# Patient Record
Sex: MALE | Race: WHITE | NOT HISPANIC OR LATINO | Employment: OTHER | URBAN - METROPOLITAN AREA
[De-identification: names, ages, dates, MRNs, and addresses within clinical notes are randomized per-mention and may not be internally consistent; named-entity substitution may affect disease eponyms.]

---

## 2019-05-23 ENCOUNTER — HOSPITAL ENCOUNTER (EMERGENCY)
Facility: HOSPITAL | Age: 69
Discharge: HOME | End: 2019-05-23
Attending: EMERGENCY MEDICINE | Admitting: EMERGENCY MEDICINE

## 2019-05-23 ENCOUNTER — APPOINTMENT (OUTPATIENT)
Dept: CT IMAGING | Facility: HOSPITAL | Age: 69
End: 2019-05-23

## 2019-05-23 VITALS
HEIGHT: 72 IN | HEART RATE: 68 BPM | SYSTOLIC BLOOD PRESSURE: 115 MMHG | RESPIRATION RATE: 16 BRPM | OXYGEN SATURATION: 98 % | DIASTOLIC BLOOD PRESSURE: 58 MMHG | TEMPERATURE: 98.1 F

## 2019-05-23 DIAGNOSIS — S80.812A ABRASION OF ANTERIOR LEFT LOWER LEG, INITIAL ENCOUNTER: ICD-10-CM

## 2019-05-23 DIAGNOSIS — S00.03XA CONTUSION OF SCALP, INITIAL ENCOUNTER: Primary | ICD-10-CM

## 2019-05-23 DIAGNOSIS — S50.312A ABRASION OF LEFT ELBOW, INITIAL ENCOUNTER: ICD-10-CM

## 2019-05-23 DIAGNOSIS — S40.011A CONTUSION OF RIGHT SHOULDER, INITIAL ENCOUNTER: ICD-10-CM

## 2019-05-23 PROCEDURE — 70450 CT HEAD/BRAIN W/O DYE: CPT

## 2019-05-23 PROCEDURE — 99283 EMERGENCY DEPT VISIT LOW MDM: CPT

## 2021-05-05 ENCOUNTER — TREATMENT (OUTPATIENT)
Dept: PHYSICAL THERAPY | Facility: CLINIC | Age: 71
End: 2021-05-05

## 2021-05-05 DIAGNOSIS — M25.562 ACUTE BILATERAL KNEE PAIN: Primary | ICD-10-CM

## 2021-05-05 DIAGNOSIS — R26.9 ABNORMAL GAIT: ICD-10-CM

## 2021-05-05 DIAGNOSIS — M25.561 ACUTE BILATERAL KNEE PAIN: Primary | ICD-10-CM

## 2021-05-05 PROCEDURE — 97110 THERAPEUTIC EXERCISES: CPT | Performed by: PHYSICAL THERAPIST

## 2021-05-05 PROCEDURE — 97162 PT EVAL MOD COMPLEX 30 MIN: CPT | Performed by: PHYSICAL THERAPIST

## 2021-05-05 NOTE — PROGRESS NOTES
Physical Therapy Initial Evaluation and Plan of Care      Patient: Onel Campbell   : 1950  Diagnosis/ICD-10 Code:  No primary diagnosis found.  Referring practitioner: Fredy Lomas MD  Date of Initial Visit: 2021  Today's Date: 2021  Patient seen for 1 sessions           Subjective Evaluation    History of Present Illness  Mechanism of injury: Pt notes difficulty with prolonged ambualtion 3-5 mins; pt had (R) foot sx resulting in 8-10 wks nwb.  Pt/ wife notes progressive immobilization has resulted in decreased tolerance with functional mobility.     nsaid contraindicated.    Subjective comment: (B) knee pain/  progressive OA knee Quality of life: good    Pain  Current pain ratin  At best pain rating: 3  At worst pain rating: 10  Quality: dull ache, tight, throbbing, sharp, discomfort and knife-like  Aggravating factors: ambulation, squatting, stairs, standing, lifting, movement, outstretched reach and repetitive movement    Social Support  Lives in: one-story house  Lives with: spouse    Patient Goals  Patient goals for therapy: increased strength, independence with ADLs/IADLs, increased motion, improved balance, decreased pain, decreased edema and return to sport/leisure activities             Objective          Observations   Left Knee   Positive for effusion.     Right Knee   Positive for effusion.     Additional Knee Observation Details  (+) antalgic gait with use of spc; limited stride length    Tenderness   Left Knee   Tenderness in the medial joint line.     Right Knee   Tenderness in the medial joint line.     Neurological Testing     Sensation     Knee   Left Knee   Intact: light touch    Right Knee   Intact: light touch     Active Range of Motion   Left Knee   Flexion: 110 degrees   Extensor la degrees     Right Knee   Flexion: 112 degrees   Extensor lag: 3 degrees     Strength/Myotome Testing     Left Knee   Flexion: 4-  Extension: 3+  Quadriceps contraction: poor    Right  Knee   Flexion: 4-  Extension: 3+  Quadriceps contraction: poor     General Comments     Knee Comments  Timed sit-stand x 5 test 27 s;  Poor tolerance with frontal / transverse plane mvmt. Poor tolerance with ascending stairs. High risk for fall. No recent hx of falls.       See Exercise, Manual, and Modality Logs for complete treatment.       Functional Outcome Score: 25% LE fx score        Assessment & Plan     Assessment  Impairments: abnormal gait, abnormal muscle firing, abnormal muscle tone, abnormal or restricted ROM, activity intolerance, impaired balance, impaired physical strength, lacks appropriate home exercise program, pain with function, safety issue and weight-bearing intolerance  Assessment details: Onel Campbell is a 71 y.o. year-old male referred to physical therapy for (B) knee pain / abnormal gait. He presents with a stable clinical presentation.  He has comorbidities of progressive OA (B) Knee and no personal factors  that may affect his progress in the plan of care.  Pt demonstrates decreased (B) knee AROM, severe ttp ant/ med joint line knee resulting in poor tolerance with sit-stand transfer; pain with prolonged sitting > 20 mins, limited standing-walking tolerance with use of spc 3-5 mins.  Pt is high risk for fall secondary to decreased LE MMT 3+/5 respectively.  malgorzata x 1 with frontal plane movement patterns. Signs and symptoms are consistent with physical therapy diagnosis of (B) knee pain/ abnormal gait.   Prognosis: good  Functional Limitations: walking, uncomfortable because of pain, standing and stooping  Goals  Plan Goals: stg 6 wks    Pt to be educated/ independent with initial HEP.    Increased (B) knee AAROM 0/0/ 115 degrees to allow for increased ease with dressing/ bathing activities.    Decreased ant/ med joint line pain from severe to minimal to allow for increased ease with sit-stand transfer.    ltg 12 wks    Improve timed sit-stand x 5 test from 27s to 22 s    Increased  (B) quad / glute medius MMT to 4/5 to allow for 15-20 min tolerance with prolonged standing/ambulation with use of spc.     Improve LE fx score from 25% to 50%    Plan  Therapy options: will be seen for skilled physical therapy services  Planned modality interventions: cryotherapy, electrical stimulation/Russian stimulation, TENS, ultrasound and thermotherapy (hydrocollator packs)  Planned therapy interventions: abdominal trunk stabilization, manual therapy, motor coordination training, neuromuscular re-education, balance/weight-bearing training, ADL retraining, flexibility, functional ROM exercises, gait training, home exercise program, joint mobilization, transfer training, therapeutic activities, strengthening, stretching and soft tissue mobilization  Other planned therapy interventions: aquatic / land based PT  Frequency: 2x week  Duration in weeks: 12  Treatment plan discussed with: patient        Timed:  Manual Therapy:         mins  50797;  Therapeutic Exercise:     15 mins  72929;     Neuromuscular Narendra:        mins  32864;    Therapeutic Activity:          mins  94118;     Gait Training:           mins  38130;     Ultrasound:          mins  46673;    Electrical Stimulation:         mins  03739 ( );  Iontophoresis         mins 10523  Dry Needling        mins      Untimed:  Electrical Stimulation:         mins  91000 ( );  Mechanical Traction:         mins  66181;     Timed Treatment:   15   mins   Total Treatment:    40    mins    PT SIGNATURE: Scot Barney, PT   DATE TREATMENT INITIATED: 5/5/2021    Initial Certification  Certification Period: 8/3/2021  I certify that the therapy services are furnished while this patient is under my care.  The services outlined above are required by this patient, and will be reviewed every 90 days.     PHYSICIAN: Fredy Lomas MD      DATE:     Please sign and return via fax to [phone] Thank you, [facility].

## 2021-05-06 ENCOUNTER — TREATMENT (OUTPATIENT)
Dept: PHYSICAL THERAPY | Facility: CLINIC | Age: 71
End: 2021-05-06

## 2021-05-06 DIAGNOSIS — R26.9 ABNORMAL GAIT: ICD-10-CM

## 2021-05-06 DIAGNOSIS — M25.561 ACUTE BILATERAL KNEE PAIN: Primary | ICD-10-CM

## 2021-05-06 DIAGNOSIS — M25.562 ACUTE BILATERAL KNEE PAIN: Primary | ICD-10-CM

## 2021-05-06 PROCEDURE — 97110 THERAPEUTIC EXERCISES: CPT | Performed by: PHYSICAL THERAPIST

## 2024-06-21 ENCOUNTER — APPOINTMENT (OUTPATIENT)
Dept: GENERAL RADIOLOGY | Facility: HOSPITAL | Age: 74
End: 2024-06-21
Payer: MEDICARE

## 2024-06-21 ENCOUNTER — APPOINTMENT (OUTPATIENT)
Dept: CARDIOLOGY | Facility: HOSPITAL | Age: 74
End: 2024-06-21
Payer: MEDICARE

## 2024-06-21 ENCOUNTER — HOSPITAL ENCOUNTER (INPATIENT)
Facility: HOSPITAL | Age: 74
LOS: 6 days | Discharge: HOME HEALTH | End: 2024-06-27
Attending: EMERGENCY MEDICINE | Admitting: HOSPITALIST
Payer: MEDICARE

## 2024-06-21 ENCOUNTER — APPOINTMENT (OUTPATIENT)
Dept: CT IMAGING | Facility: HOSPITAL | Age: 74
End: 2024-06-21
Payer: MEDICARE

## 2024-06-21 DIAGNOSIS — M46.44 DISCITIS OF THORACIC REGION: Primary | ICD-10-CM

## 2024-06-21 PROBLEM — Z85.46 HISTORY OF PROSTATE CANCER: Status: ACTIVE | Noted: 2021-05-10

## 2024-06-21 PROBLEM — I73.9 PERIPHERAL VASCULAR DISEASE: Status: ACTIVE | Noted: 2024-03-26

## 2024-06-21 PROBLEM — C61 MALIGNANT NEOPLASM OF PROSTATE: Status: ACTIVE | Noted: 2021-05-10

## 2024-06-21 PROBLEM — M14.679 CHARCOT'S JOINT OF FOOT: Status: ACTIVE | Noted: 2019-06-10

## 2024-06-21 PROBLEM — N18.30 STAGE 3 CHRONIC KIDNEY DISEASE: Status: ACTIVE | Noted: 2024-04-25

## 2024-06-21 PROBLEM — I12.0 HYPERTENSIVE CHRONIC KIDNEY DISEASE WITH STAGE 5 CHRONIC KIDNEY DISEASE OR END STAGE RENAL DISEASE: Status: ACTIVE | Noted: 2024-05-22

## 2024-06-21 PROBLEM — F11.20 OPIOID DEPENDENCE: Status: ACTIVE | Noted: 2024-06-21

## 2024-06-21 PROBLEM — G47.33 OBSTRUCTIVE SLEEP APNEA OF ADULT: Status: ACTIVE | Noted: 2024-04-25

## 2024-06-21 PROBLEM — F19.20 DRUG DEPENDENCE: Status: ACTIVE | Noted: 2023-07-11

## 2024-06-21 PROBLEM — Z86.14 HISTORY OF MRSA INFECTION: Status: ACTIVE | Noted: 2024-04-25

## 2024-06-21 PROBLEM — B95.62 METHICILLIN RESISTANT STAPHYLOCOCCUS AUREUS INFECTION AS THE CAUSE OF DISEASES CLASSIFIED ELSEWHERE: Status: ACTIVE | Noted: 2024-04-25

## 2024-06-21 PROBLEM — I10 ESSENTIAL HYPERTENSION: Status: ACTIVE | Noted: 2017-05-25

## 2024-06-21 PROBLEM — M54.9 UPPER BACK PAIN: Status: ACTIVE | Noted: 2024-06-21

## 2024-06-21 PROBLEM — I12.0 HYPERTENSIVE CHRONIC KIDNEY DISEASE WITH STAGE 5 CHRONIC KIDNEY DISEASE OR END STAGE RENAL DISEASE: Status: RESOLVED | Noted: 2024-05-22 | Resolved: 2024-06-21

## 2024-06-21 PROBLEM — M86.272: Status: ACTIVE | Noted: 2024-06-21

## 2024-06-21 PROBLEM — E11.69 TYPE 2 DIABETES MELLITUS WITH OTHER SPECIFIED COMPLICATION, WITHOUT LONG-TERM CURRENT USE OF INSULIN: Status: ACTIVE | Noted: 2024-04-25

## 2024-06-21 PROBLEM — M46.40 DISCITIS: Status: ACTIVE | Noted: 2024-06-21

## 2024-06-21 LAB
ALBUMIN SERPL-MCNC: 3.6 G/DL (ref 3.5–5.2)
ALBUMIN/GLOB SERPL: 0.9 G/DL
ALP SERPL-CCNC: 226 U/L (ref 39–117)
ALT SERPL W P-5'-P-CCNC: 11 U/L (ref 1–41)
ANION GAP SERPL CALCULATED.3IONS-SCNC: 8 MMOL/L (ref 5–15)
AST SERPL-CCNC: 18 U/L (ref 1–40)
BASOPHILS # BLD AUTO: 0.04 10*3/MM3 (ref 0–0.2)
BASOPHILS NFR BLD AUTO: 0.6 % (ref 0–1.5)
BH CV LOW VAS RIGHT MID FEMORAL SPONT: 1
BH CV LOW VAS RIGHT PROXIMAL FEMORAL SPONT: 1
BH CV LOWER VASCULAR LEFT COMMON FEMORAL AUGMENT: NORMAL
BH CV LOWER VASCULAR LEFT COMMON FEMORAL COMPETENT: NORMAL
BH CV LOWER VASCULAR LEFT COMMON FEMORAL COMPRESS: NORMAL
BH CV LOWER VASCULAR LEFT COMMON FEMORAL PHASIC: NORMAL
BH CV LOWER VASCULAR LEFT COMMON FEMORAL SPONT: NORMAL
BH CV LOWER VASCULAR LEFT DISTAL FEMORAL COMPRESS: NORMAL
BH CV LOWER VASCULAR LEFT GASTRONEMIUS COMPRESS: NORMAL
BH CV LOWER VASCULAR LEFT GREATER SAPH AK COMPRESS: NORMAL
BH CV LOWER VASCULAR LEFT GREATER SAPH BK COMPRESS: NORMAL
BH CV LOWER VASCULAR LEFT LESSER SAPH COMPRESS: NORMAL
BH CV LOWER VASCULAR LEFT MID FEMORAL AUGMENT: NORMAL
BH CV LOWER VASCULAR LEFT MID FEMORAL COMPETENT: NORMAL
BH CV LOWER VASCULAR LEFT MID FEMORAL COMPRESS: NORMAL
BH CV LOWER VASCULAR LEFT MID FEMORAL PHASIC: NORMAL
BH CV LOWER VASCULAR LEFT MID FEMORAL SPONT: NORMAL
BH CV LOWER VASCULAR LEFT PERONEAL COMPRESS: NORMAL
BH CV LOWER VASCULAR LEFT POPLITEAL AUGMENT: NORMAL
BH CV LOWER VASCULAR LEFT POPLITEAL COMPETENT: NORMAL
BH CV LOWER VASCULAR LEFT POPLITEAL COMPRESS: NORMAL
BH CV LOWER VASCULAR LEFT POPLITEAL PHASIC: NORMAL
BH CV LOWER VASCULAR LEFT POPLITEAL SPONT: NORMAL
BH CV LOWER VASCULAR LEFT POSTERIOR TIBIAL COMPRESS: NORMAL
BH CV LOWER VASCULAR LEFT PROFUNDA FEMORAL COMPRESS: NORMAL
BH CV LOWER VASCULAR LEFT PROXIMAL FEMORAL COMPRESS: NORMAL
BH CV LOWER VASCULAR LEFT SAPHENOFEMORAL JUNCTION COMPRESS: NORMAL
BH CV LOWER VASCULAR RIGHT COMMON FEMORAL AUGMENT: NORMAL
BH CV LOWER VASCULAR RIGHT COMMON FEMORAL COMPETENT: NORMAL
BH CV LOWER VASCULAR RIGHT COMMON FEMORAL COMPRESS: NORMAL
BH CV LOWER VASCULAR RIGHT COMMON FEMORAL PHASIC: NORMAL
BH CV LOWER VASCULAR RIGHT COMMON FEMORAL SPONT: NORMAL
BH CV LOWER VASCULAR RIGHT DISTAL FEMORAL COMPRESS: NORMAL
BH CV LOWER VASCULAR RIGHT GASTRONEMIUS COMPRESS: NORMAL
BH CV LOWER VASCULAR RIGHT GREATER SAPH AK COMPRESS: NORMAL
BH CV LOWER VASCULAR RIGHT GREATER SAPH BK COMPRESS: NORMAL
BH CV LOWER VASCULAR RIGHT LESSER SAPH COMPRESS: NORMAL
BH CV LOWER VASCULAR RIGHT MID FEMORAL AUGMENT: NORMAL
BH CV LOWER VASCULAR RIGHT MID FEMORAL COMPRESS: NORMAL
BH CV LOWER VASCULAR RIGHT MID FEMORAL PHASIC: NORMAL
BH CV LOWER VASCULAR RIGHT MID FEMORAL SPONT: NORMAL
BH CV LOWER VASCULAR RIGHT MID FEMORAL THROMBUS: NORMAL
BH CV LOWER VASCULAR RIGHT PERONEAL COMPRESS: NORMAL
BH CV LOWER VASCULAR RIGHT POPLITEAL AUGMENT: NORMAL
BH CV LOWER VASCULAR RIGHT POPLITEAL COMPETENT: NORMAL
BH CV LOWER VASCULAR RIGHT POPLITEAL COMPRESS: NORMAL
BH CV LOWER VASCULAR RIGHT POPLITEAL PHASIC: NORMAL
BH CV LOWER VASCULAR RIGHT POPLITEAL SPONT: NORMAL
BH CV LOWER VASCULAR RIGHT POSTERIOR TIBIAL COMPRESS: NORMAL
BH CV LOWER VASCULAR RIGHT PROFUNDA FEMORAL COMPRESS: NORMAL
BH CV LOWER VASCULAR RIGHT PROXIMAL FEMORAL COMPRESS: NORMAL
BH CV LOWER VASCULAR RIGHT PROXIMAL FEMORAL THROMBUS: NORMAL
BH CV LOWER VASCULAR RIGHT SAPHENOFEMORAL JUNCTION COMPRESS: NORMAL
BH CV POP FLUID COLLECT LEFT: 1
BILIRUB SERPL-MCNC: 0.8 MG/DL (ref 0–1.2)
BUN SERPL-MCNC: 15 MG/DL (ref 8–23)
BUN/CREAT SERPL: 16.5 (ref 7–25)
CALCIUM SPEC-SCNC: 9.5 MG/DL (ref 8.6–10.5)
CHLORIDE SERPL-SCNC: 100 MMOL/L (ref 98–107)
CO2 SERPL-SCNC: 30 MMOL/L (ref 22–29)
CREAT SERPL-MCNC: 0.91 MG/DL (ref 0.76–1.27)
CRP SERPL-MCNC: 6.73 MG/DL (ref 0–0.5)
D DIMER PPP FEU-MCNC: 2.97 MCGFEU/ML (ref 0–0.75)
D-LACTATE SERPL-SCNC: 1 MMOL/L (ref 0.5–2)
D-LACTATE SERPL-SCNC: 2.2 MMOL/L (ref 0.5–2)
DEPRECATED RDW RBC AUTO: 43 FL (ref 37–54)
EGFRCR SERPLBLD CKD-EPI 2021: 87.9 ML/MIN/1.73
EOSINOPHIL # BLD AUTO: 0.23 10*3/MM3 (ref 0–0.4)
EOSINOPHIL NFR BLD AUTO: 3.6 % (ref 0.3–6.2)
ERYTHROCYTE [DISTWIDTH] IN BLOOD BY AUTOMATED COUNT: 13.7 % (ref 12.3–15.4)
ERYTHROCYTE [SEDIMENTATION RATE] IN BLOOD: 52 MM/HR (ref 0–20)
GLOBULIN UR ELPH-MCNC: 3.8 GM/DL
GLUCOSE BLDC GLUCOMTR-MCNC: 107 MG/DL (ref 70–130)
GLUCOSE BLDC GLUCOMTR-MCNC: 91 MG/DL (ref 70–130)
GLUCOSE SERPL-MCNC: 114 MG/DL (ref 65–99)
HCT VFR BLD AUTO: 33.9 % (ref 37.5–51)
HGB BLD-MCNC: 11.1 G/DL (ref 13–17.7)
IMM GRANULOCYTES # BLD AUTO: 0.03 10*3/MM3 (ref 0–0.05)
IMM GRANULOCYTES NFR BLD AUTO: 0.5 % (ref 0–0.5)
LYMPHOCYTES # BLD AUTO: 0.79 10*3/MM3 (ref 0.7–3.1)
LYMPHOCYTES NFR BLD AUTO: 12.5 % (ref 19.6–45.3)
MCH RBC QN AUTO: 28.5 PG (ref 26.6–33)
MCHC RBC AUTO-ENTMCNC: 32.7 G/DL (ref 31.5–35.7)
MCV RBC AUTO: 87.1 FL (ref 79–97)
MONOCYTES # BLD AUTO: 0.51 10*3/MM3 (ref 0.1–0.9)
MONOCYTES NFR BLD AUTO: 8 % (ref 5–12)
NEUTROPHILS NFR BLD AUTO: 4.74 10*3/MM3 (ref 1.7–7)
NEUTROPHILS NFR BLD AUTO: 74.8 % (ref 42.7–76)
NRBC BLD AUTO-RTO: 0 /100 WBC (ref 0–0.2)
PLATELET # BLD AUTO: 245 10*3/MM3 (ref 140–450)
PMV BLD AUTO: 7.7 FL (ref 6–12)
POTASSIUM SERPL-SCNC: 4.2 MMOL/L (ref 3.5–5.2)
PROT SERPL-MCNC: 7.4 G/DL (ref 6–8.5)
QT INTERVAL: 359 MS
QTC INTERVAL: 437 MS
RBC # BLD AUTO: 3.89 10*6/MM3 (ref 4.14–5.8)
SODIUM SERPL-SCNC: 138 MMOL/L (ref 136–145)
WBC NRBC COR # BLD AUTO: 6.34 10*3/MM3 (ref 3.4–10.8)

## 2024-06-21 PROCEDURE — 80053 COMPREHEN METABOLIC PANEL: CPT | Performed by: EMERGENCY MEDICINE

## 2024-06-21 PROCEDURE — 71046 X-RAY EXAM CHEST 2 VIEWS: CPT

## 2024-06-21 PROCEDURE — 87154 CUL TYP ID BLD PTHGN 6+ TRGT: CPT | Performed by: EMERGENCY MEDICINE

## 2024-06-21 PROCEDURE — 87147 CULTURE TYPE IMMUNOLOGIC: CPT | Performed by: EMERGENCY MEDICINE

## 2024-06-21 PROCEDURE — 99285 EMERGENCY DEPT VISIT HI MDM: CPT

## 2024-06-21 PROCEDURE — 25010000002 VANCOMYCIN 750 MG RECONSTITUTED SOLUTION 1 EACH VIAL: Performed by: HOSPITALIST

## 2024-06-21 PROCEDURE — 25010000002 ONDANSETRON PER 1 MG: Performed by: EMERGENCY MEDICINE

## 2024-06-21 PROCEDURE — 93970 EXTREMITY STUDY: CPT | Performed by: SURGERY

## 2024-06-21 PROCEDURE — 25010000002 PIPERACILLIN SOD-TAZOBACTAM PER 1 G: Performed by: EMERGENCY MEDICINE

## 2024-06-21 PROCEDURE — 99223 1ST HOSP IP/OBS HIGH 75: CPT | Performed by: STUDENT IN AN ORGANIZED HEALTH CARE EDUCATION/TRAINING PROGRAM

## 2024-06-21 PROCEDURE — 25510000001 IOPAMIDOL PER 1 ML: Performed by: EMERGENCY MEDICINE

## 2024-06-21 PROCEDURE — 93970 EXTREMITY STUDY: CPT

## 2024-06-21 PROCEDURE — 72072 X-RAY EXAM THORAC SPINE 3VWS: CPT

## 2024-06-21 PROCEDURE — 25810000003 SODIUM CHLORIDE 0.9 % SOLUTION: Performed by: EMERGENCY MEDICINE

## 2024-06-21 PROCEDURE — 36415 COLL VENOUS BLD VENIPUNCTURE: CPT | Performed by: EMERGENCY MEDICINE

## 2024-06-21 PROCEDURE — 85652 RBC SED RATE AUTOMATED: CPT | Performed by: STUDENT IN AN ORGANIZED HEALTH CARE EDUCATION/TRAINING PROGRAM

## 2024-06-21 PROCEDURE — 25010000002 HYDROMORPHONE PER 4 MG: Performed by: HOSPITALIST

## 2024-06-21 PROCEDURE — 86140 C-REACTIVE PROTEIN: CPT | Performed by: STUDENT IN AN ORGANIZED HEALTH CARE EDUCATION/TRAINING PROGRAM

## 2024-06-21 PROCEDURE — 82948 REAGENT STRIP/BLOOD GLUCOSE: CPT

## 2024-06-21 PROCEDURE — 99203 OFFICE O/P NEW LOW 30 MIN: CPT | Performed by: NURSE PRACTITIONER

## 2024-06-21 PROCEDURE — 87040 BLOOD CULTURE FOR BACTERIA: CPT | Performed by: EMERGENCY MEDICINE

## 2024-06-21 PROCEDURE — 83605 ASSAY OF LACTIC ACID: CPT | Performed by: EMERGENCY MEDICINE

## 2024-06-21 PROCEDURE — 25010000002 VANCOMYCIN 10 G RECONSTITUTED SOLUTION: Performed by: EMERGENCY MEDICINE

## 2024-06-21 PROCEDURE — 93010 ELECTROCARDIOGRAM REPORT: CPT | Performed by: INTERNAL MEDICINE

## 2024-06-21 PROCEDURE — 25010000002 HYDROMORPHONE PER 4 MG: Performed by: EMERGENCY MEDICINE

## 2024-06-21 PROCEDURE — 85025 COMPLETE CBC W/AUTO DIFF WBC: CPT | Performed by: EMERGENCY MEDICINE

## 2024-06-21 PROCEDURE — 85379 FIBRIN DEGRADATION QUANT: CPT | Performed by: EMERGENCY MEDICINE

## 2024-06-21 PROCEDURE — 87186 SC STD MICRODIL/AGAR DIL: CPT | Performed by: EMERGENCY MEDICINE

## 2024-06-21 PROCEDURE — 71275 CT ANGIOGRAPHY CHEST: CPT

## 2024-06-21 PROCEDURE — 93005 ELECTROCARDIOGRAM TRACING: CPT | Performed by: EMERGENCY MEDICINE

## 2024-06-21 PROCEDURE — 25810000003 SODIUM CHLORIDE 0.9 % SOLUTION 250 ML FLEX CONT: Performed by: HOSPITALIST

## 2024-06-21 RX ORDER — BISACODYL 5 MG/1
5 TABLET, DELAYED RELEASE ORAL DAILY PRN
Status: DISCONTINUED | OUTPATIENT
Start: 2024-06-21 | End: 2024-06-23

## 2024-06-21 RX ORDER — SODIUM CHLORIDE 9 MG/ML
40 INJECTION, SOLUTION INTRAVENOUS AS NEEDED
Status: DISCONTINUED | OUTPATIENT
Start: 2024-06-21 | End: 2024-06-21

## 2024-06-21 RX ORDER — HYDROMORPHONE HYDROCHLORIDE 1 MG/ML
0.5 INJECTION, SOLUTION INTRAMUSCULAR; INTRAVENOUS; SUBCUTANEOUS ONCE
Status: COMPLETED | OUTPATIENT
Start: 2024-06-21 | End: 2024-06-21

## 2024-06-21 RX ORDER — VANCOMYCIN/0.9 % SOD CHLORIDE 1.5G/250ML
20 PLASTIC BAG, INJECTION (ML) INTRAVENOUS ONCE
Status: COMPLETED | OUTPATIENT
Start: 2024-06-21 | End: 2024-06-21

## 2024-06-21 RX ORDER — ACETAMINOPHEN 650 MG/1
650 SUPPOSITORY RECTAL EVERY 4 HOURS PRN
Status: DISCONTINUED | OUTPATIENT
Start: 2024-06-21 | End: 2024-06-21

## 2024-06-21 RX ORDER — ACETAMINOPHEN 160 MG/5ML
650 SOLUTION ORAL EVERY 4 HOURS PRN
Status: DISCONTINUED | OUTPATIENT
Start: 2024-06-21 | End: 2024-06-21

## 2024-06-21 RX ORDER — ACETAMINOPHEN 325 MG/1
650 TABLET ORAL EVERY 4 HOURS PRN
Status: DISCONTINUED | OUTPATIENT
Start: 2024-06-21 | End: 2024-06-27 | Stop reason: HOSPADM

## 2024-06-21 RX ORDER — DIAZEPAM 5 MG/ML
5 INJECTION, SOLUTION INTRAMUSCULAR; INTRAVENOUS ONCE
Status: DISCONTINUED | OUTPATIENT
Start: 2024-06-21 | End: 2024-06-22

## 2024-06-21 RX ORDER — IBUPROFEN 600 MG/1
1 TABLET ORAL
Status: DISCONTINUED | OUTPATIENT
Start: 2024-06-21 | End: 2024-06-27 | Stop reason: HOSPADM

## 2024-06-21 RX ORDER — SODIUM CHLORIDE 0.9 % (FLUSH) 0.9 %
10 SYRINGE (ML) INJECTION AS NEEDED
Status: DISCONTINUED | OUTPATIENT
Start: 2024-06-21 | End: 2024-06-21

## 2024-06-21 RX ORDER — DEXTROSE MONOHYDRATE 25 G/50ML
25 INJECTION, SOLUTION INTRAVENOUS
Status: DISCONTINUED | OUTPATIENT
Start: 2024-06-21 | End: 2024-06-27 | Stop reason: HOSPADM

## 2024-06-21 RX ORDER — OXYCODONE AND ACETAMINOPHEN 7.5; 325 MG/1; MG/1
1 TABLET ORAL EVERY 4 HOURS PRN
Status: DISCONTINUED | OUTPATIENT
Start: 2024-06-21 | End: 2024-06-27 | Stop reason: HOSPADM

## 2024-06-21 RX ORDER — HYDROCODONE BITARTRATE AND ACETAMINOPHEN 7.5; 325 MG/1; MG/1
1 TABLET ORAL EVERY 6 HOURS PRN
Status: DISCONTINUED | OUTPATIENT
Start: 2024-06-21 | End: 2024-06-21

## 2024-06-21 RX ORDER — BISACODYL 10 MG
10 SUPPOSITORY, RECTAL RECTAL DAILY PRN
Status: DISCONTINUED | OUTPATIENT
Start: 2024-06-21 | End: 2024-06-23

## 2024-06-21 RX ORDER — NICOTINE POLACRILEX 4 MG
15 LOZENGE BUCCAL
Status: DISCONTINUED | OUTPATIENT
Start: 2024-06-21 | End: 2024-06-27 | Stop reason: HOSPADM

## 2024-06-21 RX ORDER — MORPHINE SULFATE 2 MG/ML
4 INJECTION, SOLUTION INTRAMUSCULAR; INTRAVENOUS EVERY 4 HOURS PRN
Status: DISCONTINUED | OUTPATIENT
Start: 2024-06-21 | End: 2024-06-21

## 2024-06-21 RX ORDER — ONDANSETRON 2 MG/ML
4 INJECTION INTRAMUSCULAR; INTRAVENOUS ONCE
Status: COMPLETED | OUTPATIENT
Start: 2024-06-21 | End: 2024-06-21

## 2024-06-21 RX ORDER — HYDROMORPHONE HYDROCHLORIDE 1 MG/ML
0.5 INJECTION, SOLUTION INTRAMUSCULAR; INTRAVENOUS; SUBCUTANEOUS EVERY 4 HOURS PRN
Status: DISCONTINUED | OUTPATIENT
Start: 2024-06-21 | End: 2024-06-27 | Stop reason: HOSPADM

## 2024-06-21 RX ORDER — AMOXICILLIN 250 MG
2 CAPSULE ORAL 2 TIMES DAILY PRN
Status: DISCONTINUED | OUTPATIENT
Start: 2024-06-21 | End: 2024-06-23

## 2024-06-21 RX ORDER — INSULIN LISPRO 100 [IU]/ML
2-9 INJECTION, SOLUTION INTRAVENOUS; SUBCUTANEOUS
Status: DISCONTINUED | OUTPATIENT
Start: 2024-06-21 | End: 2024-06-24

## 2024-06-21 RX ORDER — NALOXONE HCL 0.4 MG/ML
0.4 VIAL (ML) INJECTION
Status: DISCONTINUED | OUTPATIENT
Start: 2024-06-21 | End: 2024-06-21

## 2024-06-21 RX ORDER — LIDOCAINE 4 G/G
1 PATCH TOPICAL
Status: DISCONTINUED | OUTPATIENT
Start: 2024-06-21 | End: 2024-06-27 | Stop reason: HOSPADM

## 2024-06-21 RX ORDER — POLYETHYLENE GLYCOL 3350 17 G/17G
17 POWDER, FOR SOLUTION ORAL DAILY PRN
Status: DISCONTINUED | OUTPATIENT
Start: 2024-06-21 | End: 2024-06-23

## 2024-06-21 RX ORDER — CARVEDILOL 12.5 MG/1
12.5 TABLET ORAL 2 TIMES DAILY WITH MEALS
COMMUNITY
End: 2024-06-27 | Stop reason: HOSPADM

## 2024-06-21 RX ORDER — SODIUM CHLORIDE 0.9 % (FLUSH) 0.9 %
10 SYRINGE (ML) INJECTION EVERY 12 HOURS SCHEDULED
Status: DISCONTINUED | OUTPATIENT
Start: 2024-06-21 | End: 2024-06-21

## 2024-06-21 RX ADMIN — ONDANSETRON 4 MG: 2 INJECTION INTRAMUSCULAR; INTRAVENOUS at 08:02

## 2024-06-21 RX ADMIN — VANCOMYCIN HYDROCHLORIDE 750 MG: 750 INJECTION, POWDER, LYOPHILIZED, FOR SOLUTION INTRAVENOUS at 23:39

## 2024-06-21 RX ADMIN — HYDROMORPHONE HYDROCHLORIDE 0.5 MG: 1 INJECTION, SOLUTION INTRAMUSCULAR; INTRAVENOUS; SUBCUTANEOUS at 08:02

## 2024-06-21 RX ADMIN — HYDROMORPHONE HYDROCHLORIDE 0.5 MG: 1 INJECTION, SOLUTION INTRAMUSCULAR; INTRAVENOUS; SUBCUTANEOUS at 18:46

## 2024-06-21 RX ADMIN — IOPAMIDOL 83 ML: 755 INJECTION, SOLUTION INTRAVENOUS at 09:31

## 2024-06-21 RX ADMIN — VANCOMYCIN HYDROCHLORIDE 1500 MG: 10 INJECTION, POWDER, LYOPHILIZED, FOR SOLUTION INTRAVENOUS at 12:11

## 2024-06-21 RX ADMIN — OXYCODONE AND ACETAMINOPHEN 1 TABLET: 7.5; 325 TABLET ORAL at 20:05

## 2024-06-21 RX ADMIN — PIPERACILLIN AND TAZOBACTAM 3.38 G: 3; .375 INJECTION, POWDER, FOR SOLUTION INTRAVENOUS at 11:32

## 2024-06-21 RX ADMIN — HYDROMORPHONE HYDROCHLORIDE 0.5 MG: 1 INJECTION, SOLUTION INTRAMUSCULAR; INTRAVENOUS; SUBCUTANEOUS at 23:43

## 2024-06-21 NOTE — CONSULTS
"Referring Provider: Samuel Dietrich MD  Reason for Consultation:     Thoracic discitis     Chief Complaint   Patient presents with    Back Pain         Subjective   History of present illness: Patient is a 74-year-old male with past medical history of BEATRIZ, CKD, PVD, prostate cancer, and MRSA infection of the foot who presents with back pain.  ID consulted for \"thoracic discitis\".    On presentation patient is afebrile with no leukocytosis.  Lactate 2.2 with CT performed which was suspicious for T3-T4 to space narrowing and endplate cortical loss concerning for disc base infection.  Neurosurgery has been consulted.    Patient has a history of MRSA bacteremia with left foot and ankle osteomyelitis status post I&D by podiatry on 4/18/2024.  Cultures at that time grew MRSA from the wound culture.  Patient was eventually discharged on doxycycline with end date of 6/12/2024.      family history is not on file.          No Known Allergies    Medication:  Antibiotics:  Anti-Infectives (From admission, onward)      Ordered     Dose/Rate Route Frequency Start Stop    06/21/24 1035  piperacillin-tazobactam (ZOSYN) 3.375 g IVPB in 100 mL NS MBP (CD)        Ordering Provider: Samuel Dietrich MD    3.375 g  over 30 Minutes Intravenous Once 06/21/24 1051 06/21/24 1208    06/21/24 1035  vancomycin IVPB 1500 mg in 0.9% NaCl (Premix) 500 mL        Ordering Provider: Samuel Dietrich MD    20 mg/kg × 81.2 kg  333.3 mL/hr over 90 Minutes Intravenous Once 06/21/24 1051                Objective     Physical Exam:   Vital Signs   Temp:  [97.9 °F (36.6 °C)-98.1 °F (36.7 °C)] 97.9 °F (36.6 °C)  Heart Rate:  [83-94] 84  Resp:  [16-18] 16  BP: (150-167)/(72-84) 166/83    GENERAL: Awake and alert, in no acute distress.   HEENT: Oropharynx is clear. Hearing is grossly normal.   EYES: PERRL. No conjunctival injection. No lid lag.   LUNGS: Normal work of breathing  GI: nondistended.   PSYCHIATRIC: Appropriate mood, affect, insight, and " "judgment.     Results Review:   I reviewed the patient's new clinical results.  I reviewed the patient's new imaging results and agree with the interpretation.  I reviewed the patient's other test results and agree with the interpretation    Lab Results   Component Value Date    WBC 6.34 06/21/2024    HGB 11.1 (L) 06/21/2024    HCT 33.9 (L) 06/21/2024    MCV 87.1 06/21/2024     06/21/2024       No results found for: \"VANCOPEAK\", \"VANCOTROUGH\", \"VANCORANDOM\"    Lab Results   Component Value Date    GLUCOSE 114 (H) 06/21/2024    BUN 15 06/21/2024    CREATININE 0.91 06/21/2024    EGFRIFNONA 51 (L) 03/27/2024    EGFRIFAFRI 62 03/27/2024    BCR 16.5 06/21/2024    CO2 30.0 (H) 06/21/2024    CALCIUM 9.5 06/21/2024    ALBUMIN 3.6 06/21/2024    AST 18 06/21/2024    ALT 11 06/21/2024         Estimated Creatinine Clearance: 80.6 mL/min (by C-G formula based on SCr of 0.91 mg/dL).      Microbiology:  6/21 blood cultures in process    Radiology:  6/21 CT chest angiogram report reviewed with T3-4 disc base narrowing and cortical loss suspicious for disc base infection.  No PE.    Assessment     #Abnormal T3-T4 disc base  #Left foot osteomyelitis in the setting of orthopedic hardware growing MRSA on suppressive Bactrim follow with [facility] ID  #Recent MRSA bacteremia  #Type 2 diabetes  #Back pain  #Substance abuse on Suboxone  #CKD     Patient is completed 6 weeks of oral therapy for MRSA bacteremia and this may be sequela of this infection.  Blood cultures pending.  Continue vancomycin goal -600.  Follow-up MRI and if suspicious for discitis would recommend CT-guided biopsy with cultures.  Obtain ESR and CRP.      Thank you for this consult.  We will continue to follow along and tailor antibiotics as the patient's clinical course evolves.    "

## 2024-06-21 NOTE — ED NOTES
Nursing report ED to floor  Onel Campbell  74 y.o.  male    HPI :  HPI (Adult)  Stated Reason for Visit: BACK PAIN X3 WEEKS  History Obtained From: patient    Chief Complaint  Chief Complaint   Patient presents with    Back Pain       Admitting doctor:   Ronaldo Camarillo MD    Admitting diagnosis:   The encounter diagnosis was Discitis of thoracic region.    Code status:   Current Code Status       Date Active Code Status Order ID Comments User Context       Not on file            Allergies:   Patient has no known allergies.    Isolation:   No active isolations    Intake and Output  No intake or output data in the 24 hours ending 06/21/24 1039    Weight:   There were no vitals filed for this visit.    Most recent vitals:   Vitals:    06/21/24 0741 06/21/24 0809 06/21/24 0901 06/21/24 0941   BP:  156/79 150/72    BP Location:  Left arm     Patient Position:  Sitting     Pulse: 94  85 83   Resp: 18      Temp: 98.1 °F (36.7 °C)      TempSrc: Tympanic      SpO2: 98%  100% 100%       Active LDAs/IV Access:   Lines, Drains & Airways       Active LDAs       Name Placement date Placement time Site Days    Peripheral IV 06/21/24 0802 Right Antecubital 06/21/24  0802  Antecubital  less than 1                    Labs (abnormal labs have a star):   Labs Reviewed   COMPREHENSIVE METABOLIC PANEL - Abnormal; Notable for the following components:       Result Value    Glucose 114 (*)     CO2 30.0 (*)     Alkaline Phosphatase 226 (*)     All other components within normal limits    Narrative:     GFR Normal >60  Chronic Kidney Disease <60  Kidney Failure <15    The GFR formula is only valid for adults with stable renal function between ages 18 and 70.   D-DIMER, QUANTITATIVE - Abnormal; Notable for the following components:    D-Dimer, Quantitative 2.97 (*)     All other components within normal limits    Narrative:     According to the assay 's published package insert, a normal (<0.50 MCGFEU/mL) D-dimer result in  "conjunction with a non-high clinical probability assessment, excludes deep vein thrombosis (DVT) and pulmonary embolism (PE) with high sensitivity.    D-dimer values increase with age and this can make VTE exclusion of an older population difficult. To address this, the [employer], based on best available evidence and recent guidelines, recommends that clinicians use age-adjusted D-dimer thresholds in patients greater than 50 years of age with: a) a low probability of PE who do not meet all Pulmonary Embolism Rule Out Criteria, or b) in those with intermediate probability of PE.   The formula for an age-adjusted D-dimer cut-off is \"age/100\".  For example, a 60 year old patient would have an age-adjusted cut-off of 0.60 MCGFEU/mL and an 80 year old 0.80 MCGFEU/mL.   CBC WITH AUTO DIFFERENTIAL - Abnormal; Notable for the following components:    RBC 3.89 (*)     Hemoglobin 11.1 (*)     Hematocrit 33.9 (*)     Lymphocyte % 12.5 (*)     All other components within normal limits   BLOOD CULTURE   BLOOD CULTURE   LACTIC ACID, PLASMA   CBC AND DIFFERENTIAL    Narrative:     The following orders were created for panel order CBC & Differential.  Procedure                               Abnormality         Status                     ---------                               -----------         ------                     CBC Auto Differential[[id number]]        Abnormal            Final result                 Please view results for these tests on the individual orders.       EKG:   ECG 12 Lead Chest Pain   Preliminary Result   HEART RATE= 89  bpm   RR Interval= 674  ms   IL Interval= 188  ms   P Horizontal Axis= 25  deg   P Front Axis= 59  deg   QRSD Interval= 101  ms   QT Interval= 359  ms   QTcB= 437  ms   QRS Axis= 70  deg   T Wave Axis= 61  deg   - OTHERWISE NORMAL ECG -   Sinus rhythm   Abnormal R-wave progression, early transition   Electronically Signed By:    Date and Time of Study: 2024-06-21 07:59:19 "          Meds given in ED:   Medications   sodium chloride 0.9 % flush 10 mL (has no administration in time range)   piperacillin-tazobactam (ZOSYN) 3.375 g IVPB in 100 mL NS MBP (CD) (has no administration in time range)   vancomycin (VANCOCIN) 20 mg/kg in sodium chloride 0.9 % 250 mL IVPB (has no administration in time range)   HYDROmorphone (DILAUDID) injection 0.5 mg (0.5 mg Intravenous Given 6/21/24 0802)   ondansetron (ZOFRAN) injection 4 mg (4 mg Intravenous Given 6/21/24 0802)   iopamidol (ISOVUE-370) 76 % injection 100 mL (83 mL Intravenous Given by Other 6/21/24 0931)       Imaging results:  CT Angiogram Chest    Result Date: 6/21/2024  1. Findings suspicious for disc space infection at T3-T4 where there is disc space narrowing, endplate cortical loss with subtle diminished vertebral body height and surrounding soft tissue thickening. Recommend further evaluation with MRI of the thoracic spine with and without contrast if patient is able to obtain MRI. 2. No evidence for pulmonary thromboembolic disease or acute abnormality in the chest. 3. Extensive coronary arterial calcifications. Discussed with Dr. Dietrich in the emergency department on 06/21/2024 at 9:55 a.m.      XR Spine Thoracic 3 View    Result Date: 6/21/2024  1. Thoracic degenerative changes. 2. No acute bony abnormality is seen.   This report was finalized on 6/21/2024 8:54 AM by Dr. Shade Ivy M.D on Workstation: [id number]      XR Chest 2 View    Result Date: 6/21/2024  1. No acute process.   This report was finalized on 6/21/2024 8:45 AM by Dr. Shade Ivy M.D on Workstation: [id number]       Ambulatory status:   - x1    Social issues:   Social History     Socioeconomic History    Marital status:        Peripheral Neurovascular  Peripheral Neurovascular (Adult)  Peripheral Neurovascular WDL: WDL    Neuro Cognitive  Neuro Cognitive (Adult)  Cognitive/Neuro/Behavioral WDL: WDL    Learning  Learning Assessment  (Adult)  Learning Readiness and Ability: no barriers identified    Respiratory  Respiratory WDL  Respiratory WDL: .WDL except (limited deep inspiration secondary to pain)    Abdominal Pain       Pain Assessments  Pain (Adult)  (0-10) Pain Rating: Rest: 5  (0-10) Pain Rating: Activity: 10  Pain Location: back  Response to Pain Interventions: nonverbal indicators absent/decreased    NIH Stroke Scale       Rob Robertson RN  06/21/24 10:39 EDT

## 2024-06-21 NOTE — Clinical Note
Level of Care: Med/Surg [1]   Diagnosis: Upper back pain [329655]   Admitting Physician: EUNICE REDMAN [[id number]]   Attending Physician: EUNICE REDMAN [[id number]]

## 2024-06-21 NOTE — CONSULTS
[facility] NEUROSURGERY CONSULT NOTE    Patient name: Onel Campbell  Referring Provider: Dr. Camarillo  Reason for Consultation: discitis    Patient Care Team:  Asif Patricia MD as PCP - General (Internal Medicine)    Chief complaint: upper back pain    Subjective .     History of present illness:    Patient is a 74 y.o.  male with history of left lower extremity osteomyelitis/MRSA bacteremia treated at the [facility] April of this year.  He has been followed by infectious disease.  He completed a course of doxycycline and is currently on Bactrim suppression.  He presents to our facility with several weeks of progressive left upper back pain.  He describes it as a stabbing pain near his shoulder blade.  It is aggravated by deep breathing, cough.  It is not necessarily aggravated by head movement or moving the arm itself.  No fever or chills.  He has had some night sweats but he states he has those intermittently at baseline.  He has no discomfort at rest.  No associated numbness or tingling around the thorax, abdomen or legs.  No leg weakness numbness or pain.  No falls.  No bowel or bladder dysfunction.  He has received dose of Zosyn and vancomycin.  He has been seen by ID service.  MRI of the thoracic spine has been requested but not yet obtained.     No prior spine surgery. Hx of prostate cancer, treated with radiation 9 years ago.  Reports recent labs and checkup unremarkable.. On Suboxone.     Review of Systems  Review of Systems   Constitutional:  Negative for chills and fever.   Musculoskeletal:  Positive for back pain.       History  PAST MEDICAL HISTORY  Past Medical History:   Diagnosis Date    Diabetes     GERD (gastroesophageal reflux disease)     Hypertension     Prostate cancer     Renal disease     Sleep apnea        PAST SURGICAL HISTORY  No past surgical history on file.    FAMILY HISTORY  No family history on file.    SOCIAL HISTORY       retired   Lives with  wife    Allergies:  Patient has no known allergies.    MEDICATIONS:  Medications Prior to Admission   Medication Sig Dispense Refill Last Dose    carvedilol (COREG) 12.5 MG tablet Take 1 tablet by mouth 2 (Two) Times a Day With Meals.   6/20/2024 at 0800    empagliflozin (Jardiance) 10 MG tablet tablet Take 1 tablet by mouth Daily. 90 tablet 3 6/20/2024 at 0800    metoprolol succinate XL (TOPROL-XL) 50 MG 24 hr tablet Take 1 tablet by mouth Daily. Do not crush or chew. 30 tablet 5 6/20/2024 at 0800    sulfamethoxazole-trimethoprim (BACTRIM DS,SEPTRA DS) 800-160 MG per tablet Take 1 tablet by mouth every 12 (twelve) hours. (Patient taking differently: Take 1 tablet by mouth Daily.) 60 tablet 0 6/20/2024 at 0800    Tirzepatide (Mounjaro) 12.5 MG/0.5ML solution pen-injector pen Inject 0.5 mL under the skin into the appropriate area as directed 1 (One) Time Per Week. 2 mL 3 6/20/2024 at 0800    bisacodyl (DULCOLAX) 10 MG suppository Insert 1 suppository into the rectum Daily. 10 suppository 0     BUMETANIDE PO Take 4 mg by mouth Daily.   More than a month    Tirzepatide (Mounjaro) 15 MG/0.5ML solution pen-injector pen Inject 0.5 mL under the skin into the appropriate area as directed 1 (One) Time Per Week. 2 mL 1          Current Facility-Administered Medications:     acetaminophen (TYLENOL) tablet 650 mg, 650 mg, Oral, Q4H PRN **OR** [DISCONTINUED] acetaminophen (TYLENOL) 160 MG/5ML oral solution 650 mg, 650 mg, Oral, Q4H PRN **OR** [DISCONTINUED] acetaminophen (TYLENOL) suppository 650 mg, 650 mg, Rectal, Q4H PRN, Natalie Jordan APRN    sennosides-docusate (PERICOLACE) 8.6-50 MG per tablet 2 tablet, 2 tablet, Oral, BID PRN **AND** polyethylene glycol (MIRALAX) packet 17 g, 17 g, Oral, Daily PRN **AND** bisacodyl (DULCOLAX) EC tablet 5 mg, 5 mg, Oral, Daily PRN **AND** bisacodyl (DULCOLAX) suppository 10 mg, 10 mg, Rectal, Daily PRN, Natalie Jordan, ANDREW    dextrose (D50W) (25 g/50 mL) IV injection 25  g, 25 g, Intravenous, Q15 Min PRN, Natalie Jordan APRN    dextrose (GLUTOSE) oral gel 15 g, 15 g, Oral, Q15 Min PRN, Natalie Jordan APRN    diazePAM (VALIUM) injection 5 mg, 5 mg, Intravenous, Once, Natalie Jordan APRN    glucagon (GLUCAGEN) injection 1 mg, 1 mg, Intramuscular, Q15 Min PRN, Natalie Jordan APRN    HYDROmorphone (DILAUDID) injection 0.5 mg, 0.5 mg, Intravenous, Q4H PRN, Ronaldo Camarillo MD    insulin lispro (HUMALOG/ADMELOG) injection 2-9 Units, 2-9 Units, Subcutaneous, 4x Daily AC & at Bedtime, Natalie Jordan APRN    Lidocaine 4 % 1 patch, 1 patch, Transdermal, Q24H, Natalie Jordan APRN    oxyCODONE-acetaminophen (PERCOCET) 7.5-325 MG per tablet 1 tablet, 1 tablet, Oral, Q4H PRN, Ronaldo Camarillo MD    Pharmacy to dose vancomycin, , Does not apply, Continuous PRN, Ronaldo Camarillo MD    vancomycin 750 mg in sodium chloride 0.9 % 250 mL IVPB-VTB, 750 mg, Intravenous, Q12H, Ronaldo Camarillo MD      Objective     Results Review:  LABS:  Results from last 7 days   Lab Units 06/21/24  0800   WBC 10*3/mm3 6.34   HEMOGLOBIN g/dL 11.1*   HEMATOCRIT % 33.9*   PLATELETS 10*3/mm3 245     Results from last 7 days   Lab Units 06/21/24  0800   SODIUM mmol/L 138   POTASSIUM mmol/L 4.2   CHLORIDE mmol/L 100   CO2 mmol/L 30.0*   BUN mg/dL 15   CREATININE mg/dL 0.91   CALCIUM mg/dL 9.5   BILIRUBIN mg/dL 0.8   ALK PHOS U/L 226*   ALT (SGPT) U/L 11   AST (SGOT) U/L 18   GLUCOSE mg/dL 114*     Results from last 7 days   Lab Units 06/21/24  0800   SED RATE mm/hr 52*     Results from last 7 days   Lab Units 06/21/24  0800   CRP mg/dL 6.73*     Results from last 7 days   Lab Units 06/21/24  0800   SED RATE mm/hr 52*     Lactate 2.2  Blood culture 6/21-pending  D-dimer 6/21 2.97    DIAGNOSTICS:  CT Angiogram Chest    Result Date: 6/21/2024  1. Findings suspicious for disc space infection at T3-T4 where there is disc space narrowing, endplate cortical loss with mild diminished  vertebral body height and surrounding soft tissue thickening. Recommend further evaluation with MRI of the thoracic spine with and without contrast if patient is able to obtain MRI. 2. No evidence for pulmonary thromboembolic disease or acute abnormality in the chest. 3. Extensive coronary arterial calcifications.  Discussed with Dr. Dietrich in the emergency department on 06/21/2024 at 9:55 a.m.  This report was finalized on 6/21/2024 12:46 PM by Dr. Prince Banuelos M.D on Workstation: [id number]      XR Spine Thoracic 3 View- Degenerative disc disease within the upper thoracic spine, appear to be most predominant at T3/4.  There is anterior osteophytosis that appears to be across the T2/3, T8/9, T9/10, T10/11, T11/12 endplates    Results Review:   I reviewed the patient's new clinical results.  I personally viewed and interpreted the patient's thoracic x-ray    Vital Signs   Temp:  [97.9 °F (36.6 °C)-98.1 °F (36.7 °C)] 97.9 °F (36.6 °C)  Heart Rate:  [83-94] 84  Resp:  [16-18] 16  BP: (150-167)/(72-84) 166/83    Physical Exam:  Physical Exam  Vitals reviewed.   Constitutional:       Appearance: Normal appearance.   Pulmonary:      Effort: Pulmonary effort is normal.   Musculoskeletal:      Thoracic back: Tenderness (Left paraspinous between the scapula and the spinous process and upper thoracic region) present. No deformity (No scapular winging) or bony tenderness.      Right lower leg: Edema present.      Left lower leg: Edema present.      Comments:      Skin:     General: Skin is warm and dry.      Findings: No rash.      Comments: Dressing to heel of right foot   Neurological:      General: No focal deficit present.      Mental Status: He is alert.      Deep Tendon Reflexes:      Reflex Scores:       Patellar reflexes are 1+ on the right side and 1+ on the left side.  Psychiatric:         Mood and Affect: Mood normal.         Speech: Speech normal.         Thought Content: Thought content normal.        Neurologic Exam     Mental Status   Speech: speech is normal   Level of consciousness: alert  Knowledge: good.   Normal comprehension.     Motor Exam   Muscle bulk: normal  Overall muscle tone: normal  5/5 bilateral lower extremity     Sensory Exam   Right leg light touch: normal  Left leg light touch: normal    Gait, Coordination, and Reflexes     Reflexes   Right patellar: 1+  Left patellar: 1+  Right ankle clonus: absent  Left ankle clonus: absent      Assessment & Plan       Discitis of thoracic region    Essential hypertension    Peripheral vascular disease    Gastroesophageal reflux disease    History of MRSA infection    Obstructive sleep apnea of adult    Type 2 diabetes mellitus with other specified complication, without long-term current use of insulin    Opioid dependence    Subacute osteomyelitis of left ankle      Problem List Items Addressed This Visit          Unprioritized    * (Principal) Discitis of thoracic region - Primary        COMORBID CONDITIONS:  Diabetes Type 2 and Hypertension hx sepsis, MRSA    Patient with history of recent MRSA bacteremia from left lower extremity wound presented to hospital with acute onset of progressive upper back pain.  CTA chest negative for PE despite elevated D-dimer.  Abnormality at the T/3 disc space. Sed rate and CRP and lactate initially elevated.  Lactate improved with antibiotics.  White blood cell count and temperature normal.  Good strength in legs and no radicular features of his back pain.  No neck pain and he is nontender to palpation percussion along the spinous process but he does have point tenderness lateral to the spinous process in the upper thoracic spine.  MRI with and without contrast of the thoracic spine pending.  ID is following.  Patient currently on vancomycin.  Will make further recommendations after MRI complete, but agree with ID that neck step would be to obtain a CT-guided disc aspiration as there is concern for active  "infection.  Also agree that this may be sequela of an infection although his acute onset of pain is unusual.    PLAN:   MRI thoracic spine with and without contrast  Agree with disc aspiration if MRI concerning for infectious etiology  Defer antibiotic management to ID service  Symptomatic pain relief    I discussed the patient's findings and my recommendations with patient, family, nursing staff, and Dr. Jasso    During patient visit, I utilized appropriate personal protective equipment including gloves. Appropriate PPE was worn during the entire visit.  Hand hygiene was completed before and after.     Donna Saenz, APRN  06/21/24  16:05 EDT    \"Dictated utilizing Dragon dictation\".      "

## 2024-06-21 NOTE — PROGRESS NOTES
"[facility] Clinical Pharmacy Services: Vancomycin Pharmacokinetic Initial Consult Note    Onel Campbell is a 74 y.o. male who is on day 1 of pharmacy to dose vancomycin.    Indication: Bone and/or Joint Infection  Consulting Provider: Ronaldo Camarillo MD  Planned Duration of Therapy: 14 days  Loading Dose Given: 1,500 mg on 6/21 at 1211  Culture/Source:   6/21: Blood culture (x2)- in process  Target: -600 mg/L.hr     Vitals/Labs  Ht: 182.8 cm (71.97\"); Wt: 81.2 kg (179 lb)  Temp Readings from Last 1 Encounters:   06/21/24 97.9 °F (36.6 °C) (Oral)    Estimated Creatinine Clearance: 80.6 mL/min (by C-G formula based on SCr of 0.91 mg/dL).     Results from last 7 days   Lab Units 06/21/24  0800   CREATININE mg/dL 0.91   WBC 10*3/mm3 6.34     Assessment/Plan:    Vancomycin Dose: 750 mg IV Q12H  Predictive AUC level for the dose ordered is 449 mg/L.hr, which is within the target of 400-600 mg/L.hr  Vanc Trough has been ordered for 6/23 at 1100    Pharmacy will follow patient's kidney function and will adjust doses and obtain levels as necessary. Thank you for involving pharmacy in this patient's care. Please contact pharmacy with any questions or concerns.                           [facility], located within [facility]  Clinical Pharmacist    "

## 2024-06-21 NOTE — H&P
"    Patient Name:  Onel Campbell  YOB: 1950  MRN:  [id number]  Admit Date:  6/21/2024  Patient Care Team:  Asif Patricia MD as PCP - General (Internal Medicine)      Subjective   History Present Illness     Chief Complaint   Patient presents with    Back Pain     History of Present Illness  Adrian Patino is a 74 y o male with a history of BEATRIZ, HTN, CKD, PVD, prostate cancer MRSA foot wounds, and GERD who presents to [facility] with complaints of 2 to 3 weeks of increasing upper back pain. He denies any recent injury, or associated symptoms numbness, tingling, change in bowel or bladder. He states the pain has been constant severe aching pain in his \"left upper quadrant\" of his back, no radiating symptoms. He denies any known exacerbating or eliminating factors.  At time of assessment he is laying in bed in no acute distress, he has walking boot on left foot. He states he was suppose to see his orthopedic foot doctor this morning for evaluation. He states wound is healed and no longer requiring dressing changes.    He only other complaint is constipation, likely related to chronic Suboxone use.     Initial evaluation in the emergency room revealed pain was pleuritic in nature, had elevated D-dimer, CTA of the chest was completed and ruled out pulmonary emboli, but showed suspicious areas for possible discitis at T3-T4. He endorses a positive history of MRSA in foot wounds. Blood cultures were drawn and are pending, he was initiated on broad-spectrum antibiotics Zosyn, and vancomycin.  MRI of thoracic spine ordered and pending.    Mr. Campbell will be admitted for further evaluation and treatment of upper back pain, and evaluation for possible discitis/osteomyelitis.    Review of Systems   Constitutional: Negative.  Negative for chills and fever.   HENT: Negative.  Negative for congestion and trouble swallowing.    Eyes: Negative.  Negative for visual disturbance.   Respiratory: " Negative.  Negative for cough, chest tightness, shortness of breath and wheezing.    Cardiovascular: Negative.  Negative for chest pain and palpitations.   Gastrointestinal:  Positive for constipation. Negative for abdominal distention, abdominal pain, diarrhea, nausea and vomiting.   Endocrine: Negative.    Genitourinary: Negative.  Negative for dysuria, frequency and urgency.   Musculoskeletal:  Positive for back pain. Negative for arthralgias.   Skin: Negative.  Negative for rash.   Allergic/Immunologic: Negative.    Neurological: Negative.  Negative for dizziness, weakness and headaches.   Hematological: Negative.    Psychiatric/Behavioral: Negative.  Negative for agitation and confusion.    All other systems reviewed and are negative.       Personal History     No past medical history on file.  No past surgical history on file.  No family history on file.     No current facility-administered medications on file prior to encounter.     Current Outpatient Medications on File Prior to Encounter   Medication Sig Dispense Refill    bisacodyl (DULCOLAX) 10 MG suppository Insert 1 suppository into the rectum Daily. 10 suppository 0    empagliflozin (Jardiance) 10 MG tablet tablet Take 1 tablet by mouth Daily. 90 tablet 3    metoprolol succinate XL (TOPROL-XL) 50 MG 24 hr tablet Take 1 tablet by mouth Daily. Do not crush or chew. 30 tablet 5    sulfamethoxazole-trimethoprim (BACTRIM DS,SEPTRA DS) 800-160 MG per tablet Take 1 tablet by mouth every 12 (twelve) hours. 60 tablet 0    Tirzepatide (Mounjaro) 12.5 MG/0.5ML solution pen-injector pen Inject 0.5 mL under the skin into the appropriate area as directed 1 (One) Time Per Week. 2 mL 3    Tirzepatide (Mounjaro) 15 MG/0.5ML solution pen-injector pen Inject 0.5 mL under the skin into the appropriate area as directed 1 (One) Time Per Week. 2 mL 1     No Known Allergies    Objective    Objective     Vital Signs  Temp:  [98.1 °F (36.7 °C)] 98.1 °F (36.7 °C)  Heart Rate:   [83-94] 83  Resp:  [18] 18  BP: (150-156)/(72-79) 150/72  SpO2:  [98 %-100 %] 100 %  on   ;   Device (Oxygen Therapy): room air  There is no height or weight on file to calculate BMI.    Physical Exam  Vitals and nursing note reviewed.   Constitutional:       General: He is awake.      Appearance: Normal appearance.   HENT:      Head: Atraumatic.      Mouth/Throat:      Mouth: Mucous membranes are dry.   Eyes:      Conjunctiva/sclera: Conjunctivae normal.   Cardiovascular:      Rate and Rhythm: Normal rate and regular rhythm.      Pulses: Normal pulses.      Heart sounds: Normal heart sounds.   Pulmonary:      Effort: Pulmonary effort is normal.      Breath sounds: Normal breath sounds.   Abdominal:      General: Bowel sounds are normal.      Palpations: Abdomen is soft.   Musculoskeletal:         General: Normal range of motion.      Right foot: Charcot foot present.      Left foot: Decreased range of motion.      Comments: Orthopedic off loading shoe in place left foot -s/p debridement /internal orthopedic hardware    Skin:     General: Skin is warm and dry.      Capillary Refill: Capillary refill takes less than 2 seconds.   Neurological:      General: No focal deficit present.      Mental Status: He is alert and oriented to person, place, and time.   Psychiatric:         Mood and Affect: Mood normal.         Behavior: Behavior is cooperative.         Results Review:  I reviewed the patient's new clinical results.  I reviewed the patient's new imaging results and agree with the interpretation.  I reviewed the patient's other test results and agree with the interpretation  I personally viewed and interpreted the patient's EKG/Telemetry data  Discussed with ED provider.    Lab Results (last 24 hours)       Procedure Component Value Units Date/Time    CBC & Differential [[id number]]  (Abnormal) Collected: 06/21/24 0800    Specimen: Blood Updated: 06/21/24 0811    Narrative:      The following orders were created for  panel order CBC & Differential.  Procedure                               Abnormality         Status                     ---------                               -----------         ------                     CBC Auto Differential[[id number]]        Abnormal            Final result                 Please view results for these tests on the individual orders.    Comprehensive Metabolic Panel [[id number]]  (Abnormal) Collected: 06/21/24 0800    Specimen: Blood Updated: 06/21/24 0829     Glucose 114 mg/dL      BUN 15 mg/dL      Creatinine 0.91 mg/dL      Sodium 138 mmol/L      Potassium 4.2 mmol/L      Chloride 100 mmol/L      CO2 30.0 mmol/L      Calcium 9.5 mg/dL      Total Protein 7.4 g/dL      Albumin 3.6 g/dL      ALT (SGPT) 11 U/L      AST (SGOT) 18 U/L      Alkaline Phosphatase 226 U/L      Total Bilirubin 0.8 mg/dL      Globulin 3.8 gm/dL      A/G Ratio 0.9 g/dL      BUN/Creatinine Ratio 16.5     Anion Gap 8.0 mmol/L      eGFR 87.9 mL/min/1.73     Narrative:      GFR Normal >60  Chronic Kidney Disease <60  Kidney Failure <15    The GFR formula is only valid for adults with stable renal function between ages 18 and 70.    D-dimer, Quantitative [[id number]]  (Abnormal) Collected: 06/21/24 0800    Specimen: Blood Updated: 06/21/24 0836     D-Dimer, Quantitative 2.97 MCGFEU/mL     Narrative:      According to the assay 's published package insert, a normal (<0.50 MCGFEU/mL) D-dimer result in conjunction with a non-high clinical probability assessment, excludes deep vein thrombosis (DVT) and pulmonary embolism (PE) with high sensitivity.    D-dimer values increase with age and this can make VTE exclusion of an older population difficult. To address this, the [employer], based on best available evidence and recent guidelines, recommends that clinicians use age-adjusted D-dimer thresholds in patients greater than 50 years of age with: a) a low probability of PE who do not meet all Pulmonary  "Embolism Rule Out Criteria, or b) in those with intermediate probability of PE.   The formula for an age-adjusted D-dimer cut-off is \"age/100\".  For example, a 60 year old patient would have an age-adjusted cut-off of 0.60 MCGFEU/mL and an 80 year old 0.80 MCGFEU/mL.    CBC Auto Differential [[id number]]  (Abnormal) Collected: 06/21/24 0800    Specimen: Blood Updated: 06/21/24 0811     WBC 6.34 10*3/mm3      RBC 3.89 10*6/mm3      Hemoglobin 11.1 g/dL      Hematocrit 33.9 %      MCV 87.1 fL      MCH 28.5 pg      MCHC 32.7 g/dL      RDW 13.7 %      RDW-SD 43.0 fl      MPV 7.7 fL      Platelets 245 10*3/mm3      Neutrophil % 74.8 %      Lymphocyte % 12.5 %      Monocyte % 8.0 %      Eosinophil % 3.6 %      Basophil % 0.6 %      Immature Grans % 0.5 %      Neutrophils, Absolute 4.74 10*3/mm3      Lymphocytes, Absolute 0.79 10*3/mm3      Monocytes, Absolute 0.51 10*3/mm3      Eosinophils, Absolute 0.23 10*3/mm3      Basophils, Absolute 0.04 10*3/mm3      Immature Grans, Absolute 0.03 10*3/mm3      nRBC 0.0 /100 WBC             Imaging Results (Last 24 Hours)       Procedure Component Value Units Date/Time    CT Angiogram Chest [[id number]] Collected: 06/21/24 1003     Updated: 06/21/24 1003    Narrative:      CT ANGIOGRAM OF THE CHEST. MULTIPLE CORONAL, SAGITTAL, AND 3-D  RECONSTRUCTIONS.     HISTORY: Left upper back pain. Chest pain.     TECHNIQUE: Radiation dose reduction techniques were utilized, including  automated exposure control and exposure modulation based on body size.   CT angiogram of the chest was performed following the administration of  IV contrast. Coronal, sagittal, and 3-D reconstruction images were  obtained.      COMPARISON: None     FINDINGS:  There are multifocal coronary arterial calcifications.  There is no intrapulmonary arterial filling defect to diagnose a  pulmonary embolus. No mediastinal or hilar or axillary leticia enlargement  is demonstrated. There is a calcified nodule in the right lower " lobe. No  suspicious pulmonary nodule or pleural effusion or infiltrate.  Multilevel bridging endplate osteophyte formation is present in the  thoracic spine. Old healed proximal sternal body fracture. Imaging  through the upper abdomen appears within normal limits.     There is abnormal soft tissue density and thickening surrounding the  T3-T4 disc space. There are areas of endplate cortical loss at T3-T4  with disc space narrowing and this combination of findings is suspicious  for a disc space infection. There is also mild diminished height of the  T3 and T4 vertebral bodies with depression of the inferior plate of T3  and superior endplate of T4.       Impression:      1. Findings suspicious for disc space infection at T3-T4 where there is  disc space narrowing, endplate cortical loss with subtle diminished  vertebral body height and surrounding soft tissue thickening. Recommend  further evaluation with MRI of the thoracic spine with and without  contrast if patient is able to obtain MRI.  2. No evidence for pulmonary thromboembolic disease or acute abnormality  in the chest.  3. Extensive coronary arterial calcifications. Discussed with Dr. Dietrich in the emergency department on 06/21/2024 at 9:55 a.m.       XR Spine Thoracic 3 View [[id number]] Collected: 06/21/24 0847     Updated: 06/21/24 0857    Narrative:      XR SPINE THORACIC 3 VW-6/21/2024     HISTORY: Back pain.     There is normal alignment. Hypertrophic changes are seen in the thoracic  vertebrae. No thoracic fractures are seen. No subluxation is seen.       Impression:      1. Thoracic degenerative changes.  2. No acute bony abnormality is seen.        This report was finalized on 6/21/2024 8:54 AM by Dr. Shade Ivy M.D on Workstation: [id number]       XR Chest 2 View [[id number]] Collected: 06/21/24 0845     Updated: 06/21/24 0848    Narrative:      XR CHEST 2 VW-6/21/2024     HISTORY: Back pain.     Heart size is within normal limits.  Lungs are slightly underinflated but  appear clear. There are degenerative changes in the spine.       Impression:      1. No acute process.        This report was finalized on 6/21/2024 8:45 AM by Dr. Shade Ivy M.D on Workstation: [id number]                   ECG 12 Lead Chest Pain   Preliminary Result   HEART RATE= 89  bpm   RR Interval= 674  ms   OK Interval= 188  ms   P Horizontal Axis= 25  deg   P Front Axis= 59  deg   QRSD Interval= 101  ms   QT Interval= 359  ms   QTcB= 437  ms   QRS Axis= 70  deg   T Wave Axis= 61  deg   - OTHERWISE NORMAL ECG -   Sinus rhythm   Abnormal R-wave progression, early transition   Electronically Signed By:    Date and Time of Study: 2024-06-21 07:59:19           Assessment/Plan     Active Hospital Problems    Diagnosis  POA    **Upper back pain [M54.9]  Yes    Discitis [M46.40]  Yes    Stage 3 chronic kidney disease [N18.30]  Yes    Obstructive sleep apnea of adult [G47.33]  Yes    Peripheral vascular disease [I73.9]  Yes    Essential hypertension [I10]  Yes    Gastroesophageal reflux disease [K21.9]  Yes      Resolved Hospital Problems   No resolved problems to display.     Upper back pain  Discitis   CTA of chest reviewed   MRI of thoracic spine ordered   As needed pain medication with acetaminophen, Norco, morphine  Blood cultures pending-follow cultures  Broad-spectrum antibiotics to cover for MRSA ordered  WBCs 6.34    Chronic osteomyelitis of left foot   Follows with ID at [facility]  Orthopedic hardware   On chronic bactrim   Follows with Dr Seth/ orthopedic foot surgeon   Reports his foot wounds are healed   Last I&D 04/18  with positive MRSA cultures     Drug abuse   On Suboxone  Follows at [facility]  clinic     Stage 3 chronic kidney disease  Chronic/Stable   Creatinine stable 0.97, BUN is 15 today, GFR 51.  Monitor I's and O's  Avoid nephrotoxic medications    Anemia of chronic disease   Hemoglobin 11.1 hematocrit 33.9   Baseline hbg 13-14  No signs of bleeding   Monitor  CBC   Transfuse as clinically appropriate     Essential hypertension  Chronic   /72 at time of arrival to ER   Continue home antihypertensives pending home medication list reconciliation    Type 2 diabetes mellitus  Chronic  Complicated by peripheral neuropathy   Hold home Mounjaro, metformin   Accu-Cheks ACHS  SSI ordered for hyperglycemia  Last A1c 5.4 02/08/2024  Will monitor for now   Continue home gabapentin    Obstructive sleep apnea of adult  Not on home CPAP   Supplemental oxygen as needed     I discussed the patient's findings and my recommendations with patient.    VTE Prophylaxis - SCDs.  Code Status - Full code.     Patients home medication list is incomplete he is unable to provide detailed information. Will need additional evaluation once home medications have been verified.     ANDREW Jin  [facility]  06/21/24  10:47 EDT

## 2024-06-21 NOTE — ED PROVIDER NOTES
EMERGENCY DEPARTMENT ENCOUNTER    Room Number:  15/15  PCP: Asif Patricia MD  Historian: Patient      HPI:  Chief Complaint: Back pain  A complete HPI/ROS/PMH/PSH/SH/FH are unobtainable due to: None  Context: Onel Campbell is a 74 y.o. male who presents to the ED c/o back pain.  Patient states he has been having back pain left upper back for the last 3 weeks.  Got significantly worse over the last few days.  Pain with deep breathing or movement.  Patient has had no chest pain.  No pain if he does not move at all.  Patient has had no nausea or vomiting.  Patient does have Charcot foot and is in a boot.  Has had no leg swelling.            PAST MEDICAL HISTORY  Active Ambulatory Problems     Diagnosis Date Noted    Drug dependence 07/11/2023    Charcot's joint of foot 06/10/2019    History of prostate cancer 05/10/2021    History of MRSA infection 04/25/2024    Type 2 diabetes mellitus with other specified complication, without long-term current use of insulin 04/25/2024     Resolved Ambulatory Problems     Diagnosis Date Noted    Hypertensive chronic kidney disease with stage 5 chronic kidney disease or end stage renal disease 05/22/2024     No Additional Past Medical History         PAST SURGICAL HISTORY  No past surgical history on file.      FAMILY HISTORY  No family history on file.      SOCIAL HISTORY  Social History     Socioeconomic History    Marital status:          ALLERGIES  Patient has no known allergies.        REVIEW OF SYSTEMS  Review of Systems   Back pain      PHYSICAL EXAM  ED Triage Vitals [06/21/24 0741]   Temp Heart Rate Resp BP SpO2   98.1 °F (36.7 °C) 94 18 -- 98 %      Temp src Heart Rate Source Patient Position BP Location FiO2 (%)   Tympanic Monitor -- -- --       Physical Exam      GENERAL: no acute distress  HENT: nares patent  EYES: no scleral icterus  CV: regular rhythm, normal rate  RESPIRATORY: normal effort  ABDOMEN: soft  MUSCULOSKELETAL: no deformity.  Mild  tenderness left upper back  NEURO: alert, moves all extremities, follows commands  PSYCH:  calm, cooperative  SKIN: warm, dry.  No vesicular rash    Vital signs and nursing notes reviewed.          LAB RESULTS  Recent Results (from the past 24 hour(s))   ECG 12 Lead Chest Pain    Collection Time: 06/21/24  7:59 AM   Result Value Ref Range    QT Interval 359 ms    QTC Interval 437 ms   Comprehensive Metabolic Panel    Collection Time: 06/21/24  8:00 AM    Specimen: Blood   Result Value Ref Range    Glucose 114 (H) 65 - 99 mg/dL    BUN 15 8 - 23 mg/dL    Creatinine 0.91 0.76 - 1.27 mg/dL    Sodium 138 136 - 145 mmol/L    Potassium 4.2 3.5 - 5.2 mmol/L    Chloride 100 98 - 107 mmol/L    CO2 30.0 (H) 22.0 - 29.0 mmol/L    Calcium 9.5 8.6 - 10.5 mg/dL    Total Protein 7.4 6.0 - 8.5 g/dL    Albumin 3.6 3.5 - 5.2 g/dL    ALT (SGPT) 11 1 - 41 U/L    AST (SGOT) 18 1 - 40 U/L    Alkaline Phosphatase 226 (H) 39 - 117 U/L    Total Bilirubin 0.8 0.0 - 1.2 mg/dL    Globulin 3.8 gm/dL    A/G Ratio 0.9 g/dL    BUN/Creatinine Ratio 16.5 7.0 - 25.0    Anion Gap 8.0 5.0 - 15.0 mmol/L    eGFR 87.9 >60.0 mL/min/1.73   D-dimer, Quantitative    Collection Time: 06/21/24  8:00 AM    Specimen: Blood   Result Value Ref Range    D-Dimer, Quantitative 2.97 (H) 0.00 - 0.75 MCGFEU/mL   CBC Auto Differential    Collection Time: 06/21/24  8:00 AM    Specimen: Blood   Result Value Ref Range    WBC 6.34 3.40 - 10.80 10*3/mm3    RBC 3.89 (L) 4.14 - 5.80 10*6/mm3    Hemoglobin 11.1 (L) 13.0 - 17.7 g/dL    Hematocrit 33.9 (L) 37.5 - 51.0 %    MCV 87.1 79.0 - 97.0 fL    MCH 28.5 26.6 - 33.0 pg    MCHC 32.7 31.5 - 35.7 g/dL    RDW 13.7 12.3 - 15.4 %    RDW-SD 43.0 37.0 - 54.0 fl    MPV 7.7 6.0 - 12.0 fL    Platelets 245 140 - 450 10*3/mm3    Neutrophil % 74.8 42.7 - 76.0 %    Lymphocyte % 12.5 (L) 19.6 - 45.3 %    Monocyte % 8.0 5.0 - 12.0 %    Eosinophil % 3.6 0.3 - 6.2 %    Basophil % 0.6 0.0 - 1.5 %    Immature Grans % 0.5 0.0 - 0.5 %    Neutrophils,  Absolute 4.74 1.70 - 7.00 10*3/mm3    Lymphocytes, Absolute 0.79 0.70 - 3.10 10*3/mm3    Monocytes, Absolute 0.51 0.10 - 0.90 10*3/mm3    Eosinophils, Absolute 0.23 0.00 - 0.40 10*3/mm3    Basophils, Absolute 0.04 0.00 - 0.20 10*3/mm3    Immature Grans, Absolute 0.03 0.00 - 0.05 10*3/mm3    nRBC 0.0 0.0 - 0.2 /100 WBC       Ordered the above labs and reviewed the results.        RADIOLOGY  CT Angiogram Chest    Result Date: 6/21/2024  CT ANGIOGRAM OF THE CHEST. MULTIPLE CORONAL, SAGITTAL, AND 3-D RECONSTRUCTIONS.  HISTORY: Left upper back pain. Chest pain.  TECHNIQUE: Radiation dose reduction techniques were utilized, including automated exposure control and exposure modulation based on body size. CT angiogram of the chest was performed following the administration of IV contrast. Coronal, sagittal, and 3-D reconstruction images were obtained.  COMPARISON: None  FINDINGS: There are multifocal coronary arterial calcifications. There is no intrapulmonary arterial filling defect to diagnose a pulmonary embolus. No mediastinal or hilar or axillary leticia enlargement is demonstrated. There is a calcified nodule in the right lower lobe. No suspicious pulmonary nodule or pleural effusion or infiltrate. Multilevel bridging endplate osteophyte formation is present in the thoracic spine. Old healed proximal sternal body fracture. Imaging through the upper abdomen appears within normal limits.  There is abnormal soft tissue density and thickening surrounding the T3-T4 disc space. There are areas of endplate cortical loss at T3-T4 with disc space narrowing and this combination of findings is suspicious for a disc space infection. There is also mild diminished height of the T3 and T4 vertebral bodies with depression of the inferior plate of T3 and superior endplate of T4.      1. Findings suspicious for disc space infection at T3-T4 where there is disc space narrowing, endplate cortical loss with subtle diminished vertebral body  height and surrounding soft tissue thickening. Recommend further evaluation with MRI of the thoracic spine with and without contrast if patient is able to obtain MRI. 2. No evidence for pulmonary thromboembolic disease or acute abnormality in the chest. 3. Extensive coronary arterial calcifications. Discussed with Dr. Dietrich in the emergency department on 06/21/2024 at 9:55 a.m.      XR Spine Thoracic 3 View    Result Date: 6/21/2024  XR SPINE THORACIC 3 VW-6/21/2024  HISTORY: Back pain.  There is normal alignment. Hypertrophic changes are seen in the thoracic vertebrae. No thoracic fractures are seen. No subluxation is seen.      1. Thoracic degenerative changes. 2. No acute bony abnormality is seen.   This report was finalized on 6/21/2024 8:54 AM by Dr. Shade Ivy M.D on Workstation: "[employer]      XR Chest 2 View    Result Date: 6/21/2024  XR CHEST 2 VW-6/21/2024  HISTORY: Back pain.  Heart size is within normal limits. Lungs are slightly underinflated but appear clear. There are degenerative changes in the spine.      1. No acute process.   This report was finalized on 6/21/2024 8:45 AM by Dr. Shade Ivy M.D on Workstation: "[employer]       Ordered the above noted radiological studies.  Chest x-ray independently interpreted by me and shows no evidence of pneumonia          PROCEDURES  Procedures    EKG          EKG time: 759  Rhythm/Rate: Normal sinus rhythm 89  P waves and PA: Normal P waves  QRS, axis: Normal QRS  ST and T waves: Normal ST-T wave    Interpreted Contemporaneously by me, independently viewed  No prior          MEDICATIONS GIVEN IN ER  Medications   sodium chloride 0.9 % flush 10 mL (has no administration in time range)   piperacillin-tazobactam (ZOSYN) 3.375 g IVPB in 100 mL NS MBP (CD) (3.375 g Intravenous New Bag 6/21/24 1132)   vancomycin IVPB 1500 mg in 0.9% NaCl (Premix) 500 mL (has no administration in time range)   sodium chloride 0.9 % flush 10 mL (has no administration  in time range)   sodium chloride 0.9 % flush 10 mL (has no administration in time range)   sodium chloride 0.9 % infusion 40 mL (has no administration in time range)   acetaminophen (TYLENOL) tablet 650 mg (has no administration in time range)     Or   acetaminophen (TYLENOL) 160 MG/5ML oral solution 650 mg (has no administration in time range)     Or   acetaminophen (TYLENOL) suppository 650 mg (has no administration in time range)   Lidocaine 4 % 1 patch (has no administration in time range)   sennosides-docusate (PERICOLACE) 8.6-50 MG per tablet 2 tablet (has no administration in time range)     And   polyethylene glycol (MIRALAX) packet 17 g (has no administration in time range)     And   bisacodyl (DULCOLAX) EC tablet 5 mg (has no administration in time range)     And   bisacodyl (DULCOLAX) suppository 10 mg (has no administration in time range)   HYDROmorphone (DILAUDID) injection 0.5 mg (0.5 mg Intravenous Given 6/21/24 0802)   ondansetron (ZOFRAN) injection 4 mg (4 mg Intravenous Given 6/21/24 0802)   iopamidol (ISOVUE-370) 76 % injection 100 mL (83 mL Intravenous Given by Other 6/21/24 0931)                   MEDICAL DECISION MAKING, PROGRESS, and CONSULTS    All labs have been independently reviewed by me.  All radiology studies have been reviewed by me and I have also reviewed the radiology report.   EKG's independently viewed and interpreted by me.  Discussion below represents my analysis of pertinent findings related to patient's condition, differential diagnosis, treatment plan and final disposition.      Additional sources:  - Discussed/ obtained information from independent historians: None    - External (non-ED) record review: Epic reviewed patient seen by infectious disease 6/12/2024 for chronic kidney disease    - Chronic or social conditions impacting care: None    - Shared decision making: None      Orders placed during this visit:  Orders Placed This Encounter   Procedures    Blood Culture -  Blood,    Blood Culture - Blood,    XR Chest 2 View    XR Spine Thoracic 3 View    CT Angiogram Chest    MRI Thoracic Spine With & Without Contrast    Comprehensive Metabolic Panel    D-dimer, Quantitative    CBC Auto Differential    Lactic Acid, Plasma    Basic Metabolic Panel    Diet: Cardiac, Diabetic; Healthy Heart (2-3 Na+); Consistent Carbohydrate; Fluid Consistency: Thin (IDDSI 0)    Vital Signs    Intake & Output    Weigh Patient    Oral Care    Saline Lock & Maintain IV Access    Place Sequential Compression Device    Maintain Sequential Compression Device    Up With Assistance    Daily Weights    Neurovascular Checks    Code Status and Medical Interventions:    [facility] (on-call MD unless specified) Details    PT Consult: Eval & Treat As Tolerated; Functional Mobility Below Baseline    Incentive Spirometry    Oxygen Therapy- Nasal Cannula; Titrate 1-6 LPM Per SpO2; 90 - 95%    ECG 12 Lead Chest Pain    Insert Peripheral IV    Insert Peripheral IV    Initiate Observation Status    Initiate Observation Status    CBC & Differential    CBC & Differential         Additional orders considered but not ordered:  None        Differential diagnosis includes but is not limited to:    Discitis versus osteomyelitis versus degenerative disease      Independent interpretation of labs, radiology studies, and discussions with consultants:  ED Course as of 06/21/24 1145   Fri Jun 21, 2024   1036 10:36 EDT  Patient presents for upper back pain.  Pleuritic in nature.  Gradually getting worse.  Patient does have history of MRSA in foot wounds.  Patient has CT scan that shows no evidence of PE but area concerning for discitis.  Patient has been given antibiotics.  Patient discussed with [facility] who will admit to Dr. Camarillo.   [SL]      ED Course User Index  [SL] Samuel Dietrich MD                 DIAGNOSIS  Final diagnoses:   Discitis of thoracic region         DISPOSITION  admit            Latest Documented Vital Signs:  As of  11:45 EDT  BP- 167/84 HR- 86 Temp- 98.1 °F (36.7 °C) (Tympanic) O2 sat- 100%              --    Please note that portions of this were completed with a voice recognition program.       Note Disclaimer: At [facility], we believe that sharing information builds trust and better relationships. You are receiving this note because you are receiving care at [facility] or recently visited. It is possible you will see health information before a provider has talked with you about it. This kind of information can be easy to misunderstand. To help you fully understand what it means for your health, we urge you to discuss this note with your provider.            Samuel Dietrich MD  06/21/24 8459

## 2024-06-22 ENCOUNTER — APPOINTMENT (OUTPATIENT)
Dept: MRI IMAGING | Facility: HOSPITAL | Age: 74
End: 2024-06-22
Payer: MEDICARE

## 2024-06-22 LAB
ANION GAP SERPL CALCULATED.3IONS-SCNC: 7 MMOL/L (ref 5–15)
BACTERIA BLD CULT: ABNORMAL
BASOPHILS # BLD AUTO: 0.04 10*3/MM3 (ref 0–0.2)
BASOPHILS NFR BLD AUTO: 0.8 % (ref 0–1.5)
BOTTLE TYPE: ABNORMAL
BUN SERPL-MCNC: 12 MG/DL (ref 8–23)
BUN/CREAT SERPL: 13.8 (ref 7–25)
CALCIUM SPEC-SCNC: 8.9 MG/DL (ref 8.6–10.5)
CHLORIDE SERPL-SCNC: 103 MMOL/L (ref 98–107)
CO2 SERPL-SCNC: 28 MMOL/L (ref 22–29)
CREAT SERPL-MCNC: 0.87 MG/DL (ref 0.76–1.27)
DEPRECATED RDW RBC AUTO: 42 FL (ref 37–54)
EGFRCR SERPLBLD CKD-EPI 2021: 90 ML/MIN/1.73
EOSINOPHIL # BLD AUTO: 0.27 10*3/MM3 (ref 0–0.4)
EOSINOPHIL NFR BLD AUTO: 5.4 % (ref 0.3–6.2)
ERYTHROCYTE [DISTWIDTH] IN BLOOD BY AUTOMATED COUNT: 13.5 % (ref 12.3–15.4)
GLUCOSE BLDC GLUCOMTR-MCNC: 111 MG/DL (ref 70–130)
GLUCOSE BLDC GLUCOMTR-MCNC: 118 MG/DL (ref 70–130)
GLUCOSE BLDC GLUCOMTR-MCNC: 126 MG/DL (ref 70–130)
GLUCOSE BLDC GLUCOMTR-MCNC: 147 MG/DL (ref 70–130)
GLUCOSE SERPL-MCNC: 94 MG/DL (ref 65–99)
HCT VFR BLD AUTO: 29.4 % (ref 37.5–51)
HGB BLD-MCNC: 9.6 G/DL (ref 13–17.7)
IMM GRANULOCYTES # BLD AUTO: 0.01 10*3/MM3 (ref 0–0.05)
IMM GRANULOCYTES NFR BLD AUTO: 0.2 % (ref 0–0.5)
LYMPHOCYTES # BLD AUTO: 0.87 10*3/MM3 (ref 0.7–3.1)
LYMPHOCYTES NFR BLD AUTO: 17.4 % (ref 19.6–45.3)
MCH RBC QN AUTO: 28.2 PG (ref 26.6–33)
MCHC RBC AUTO-ENTMCNC: 32.7 G/DL (ref 31.5–35.7)
MCV RBC AUTO: 86.5 FL (ref 79–97)
MONOCYTES # BLD AUTO: 0.36 10*3/MM3 (ref 0.1–0.9)
MONOCYTES NFR BLD AUTO: 7.2 % (ref 5–12)
NEUTROPHILS NFR BLD AUTO: 3.44 10*3/MM3 (ref 1.7–7)
NEUTROPHILS NFR BLD AUTO: 69 % (ref 42.7–76)
NRBC BLD AUTO-RTO: 0 /100 WBC (ref 0–0.2)
PLATELET # BLD AUTO: 188 10*3/MM3 (ref 140–450)
PMV BLD AUTO: 8 FL (ref 6–12)
POTASSIUM SERPL-SCNC: 4.2 MMOL/L (ref 3.5–5.2)
RBC # BLD AUTO: 3.4 10*6/MM3 (ref 4.14–5.8)
SODIUM SERPL-SCNC: 138 MMOL/L (ref 136–145)
WBC NRBC COR # BLD AUTO: 4.99 10*3/MM3 (ref 3.4–10.8)

## 2024-06-22 PROCEDURE — 25010000002 DIAZEPAM PER 5 MG: Performed by: STUDENT IN AN ORGANIZED HEALTH CARE EDUCATION/TRAINING PROGRAM

## 2024-06-22 PROCEDURE — 99233 SBSQ HOSP IP/OBS HIGH 50: CPT | Performed by: INTERNAL MEDICINE

## 2024-06-22 PROCEDURE — 99231 SBSQ HOSP IP/OBS SF/LOW 25: CPT | Performed by: NEUROLOGICAL SURGERY

## 2024-06-22 PROCEDURE — 82948 REAGENT STRIP/BLOOD GLUCOSE: CPT

## 2024-06-22 PROCEDURE — 72157 MRI CHEST SPINE W/O & W/DYE: CPT

## 2024-06-22 PROCEDURE — 85025 COMPLETE CBC W/AUTO DIFF WBC: CPT | Performed by: NURSE PRACTITIONER

## 2024-06-22 PROCEDURE — A9577 INJ MULTIHANCE: HCPCS | Performed by: STUDENT IN AN ORGANIZED HEALTH CARE EDUCATION/TRAINING PROGRAM

## 2024-06-22 PROCEDURE — 0 GADOBENATE DIMEGLUMINE 529 MG/ML SOLUTION: Performed by: STUDENT IN AN ORGANIZED HEALTH CARE EDUCATION/TRAINING PROGRAM

## 2024-06-22 PROCEDURE — 25010000002 VANCOMYCIN 750 MG RECONSTITUTED SOLUTION 1 EACH VIAL: Performed by: HOSPITALIST

## 2024-06-22 PROCEDURE — 25010000002 HYDROMORPHONE PER 4 MG: Performed by: HOSPITALIST

## 2024-06-22 PROCEDURE — 80048 BASIC METABOLIC PNL TOTAL CA: CPT | Performed by: NURSE PRACTITIONER

## 2024-06-22 PROCEDURE — 25810000003 SODIUM CHLORIDE 0.9 % SOLUTION 250 ML FLEX CONT: Performed by: HOSPITALIST

## 2024-06-22 RX ORDER — GABAPENTIN 300 MG/1
300 CAPSULE ORAL NIGHTLY
COMMUNITY

## 2024-06-22 RX ORDER — METOPROLOL SUCCINATE 50 MG/1
50 TABLET, EXTENDED RELEASE ORAL DAILY
Status: DISCONTINUED | OUTPATIENT
Start: 2024-06-22 | End: 2024-06-27 | Stop reason: HOSPADM

## 2024-06-22 RX ORDER — DIAZEPAM 5 MG/ML
5 INJECTION, SOLUTION INTRAMUSCULAR; INTRAVENOUS ONCE
Status: COMPLETED | OUTPATIENT
Start: 2024-06-22 | End: 2024-06-22

## 2024-06-22 RX ORDER — GABAPENTIN 300 MG/1
300 CAPSULE ORAL NIGHTLY
Status: DISCONTINUED | OUTPATIENT
Start: 2024-06-22 | End: 2024-06-27 | Stop reason: HOSPADM

## 2024-06-22 RX ADMIN — GABAPENTIN 300 MG: 300 CAPSULE ORAL at 20:23

## 2024-06-22 RX ADMIN — HYDROMORPHONE HYDROCHLORIDE 0.5 MG: 1 INJECTION, SOLUTION INTRAMUSCULAR; INTRAVENOUS; SUBCUTANEOUS at 20:23

## 2024-06-22 RX ADMIN — OXYCODONE AND ACETAMINOPHEN 1 TABLET: 7.5; 325 TABLET ORAL at 14:28

## 2024-06-22 RX ADMIN — METOPROLOL SUCCINATE 50 MG: 50 TABLET, FILM COATED, EXTENDED RELEASE ORAL at 14:28

## 2024-06-22 RX ADMIN — HYDROMORPHONE HYDROCHLORIDE 0.5 MG: 1 INJECTION, SOLUTION INTRAMUSCULAR; INTRAVENOUS; SUBCUTANEOUS at 03:33

## 2024-06-22 RX ADMIN — OXYCODONE AND ACETAMINOPHEN 1 TABLET: 7.5; 325 TABLET ORAL at 22:39

## 2024-06-22 RX ADMIN — LIDOCAINE 1 PATCH: 4 PATCH TOPICAL at 08:58

## 2024-06-22 RX ADMIN — OXYCODONE AND ACETAMINOPHEN 1 TABLET: 7.5; 325 TABLET ORAL at 08:58

## 2024-06-22 RX ADMIN — GADOBENATE DIMEGLUMINE 17 ML: 529 INJECTION, SOLUTION INTRAVENOUS at 18:57

## 2024-06-22 RX ADMIN — VANCOMYCIN HYDROCHLORIDE 750 MG: 750 INJECTION, POWDER, LYOPHILIZED, FOR SOLUTION INTRAVENOUS at 22:45

## 2024-06-22 RX ADMIN — DIAZEPAM 5 MG: 5 INJECTION, SOLUTION INTRAMUSCULAR; INTRAVENOUS at 17:46

## 2024-06-22 RX ADMIN — VANCOMYCIN HYDROCHLORIDE 750 MG: 750 INJECTION, POWDER, LYOPHILIZED, FOR SOLUTION INTRAVENOUS at 11:53

## 2024-06-22 NOTE — PLAN OF CARE
Goal Outcome Evaluation:   Patient is Aox4. VSS on RA. Ambulating assist x1. Pain has been adequately controlled with PRN Percocet. Blood cultures positive for MRSA, contact precautions initiated. Vanc trough tomorrow (6/23) at 1100. MRI completed today, treatment plan pending results. All needs currently met, will continue to monitor.

## 2024-06-22 NOTE — PLAN OF CARE
Goal Outcome Evaluation:  Plan of Care Reviewed With: patient        Progress: improving  Outcome Evaluation: Patient is here with back pain. VSS. PO/IV pain medication helping with pain. Up with assistance. Voiding fine per urinal. Doppler completed. MRI still pending. Education provided blood pressure and blood sugar monitoring. Patient verbalized understanding.

## 2024-06-22 NOTE — PLAN OF CARE
Goal Outcome Evaluation:Admitted with back pain, VSS, afebrile, imaging shows poss infection at T3-T4. CT angio was neg for PE, MRI ordered with contrast, but will have to wait until tomorrow d/t use of contrast. Accuchecks with  s/s insulin. Doppler Right lower ext negative for DVT. Pain controlled with po pain medication. Will cont to monitor.

## 2024-06-22 NOTE — PROGRESS NOTES
ID note for MRSA septicemia    Subjective: Complains of ongoing back pain.  Afebrile.  Tolerating antibiotics.    Objective:   Temp:  [97.7 °F (36.5 °C)-99 °F (37.2 °C)] 97.9 °F (36.6 °C)  Heart Rate:  [77-91] 91  Resp:  [16] 16  BP: (112-158)/(53-80) 120/77  GENERAL: Awake and alert, in no acute distress.   HEENT: Oropharynx is clear. Hearing is grossly normal.   EYES: . No conjunctival injection. No lid lag.   LUNGS:normal respiratory effort.   SKIN: no cutaneous eruptions in exposed areas  PSYCHIATRIC: Appropriate mood, affect, insight, and judgment.     White count 4.99  Creatinine 0.87  Glucose 91 3/1/2007    6/21 blood cultures 2/2 MRSA      Assessment and plan  1.  MRSA bacteremia  2.  Left foot osteomyelitis in the setting of hardware infection on suppressive Bactrim  3.  Diabetes mellitus type 2  4.  Probable thoracic discitis    Blood cultures now positive for MRSA.  Continue vancomycin for AUC of 400-600.  Check trough in morning.  Awaiting MRI thoracic spine.  We will repeat blood cultures in the morning to document sterility please.  Likely to need MARCELLUS given recurrent bacteremia.  This was reportedly negative in April 2024.  Seems to be doing reasonably well despite life-threatening infection.

## 2024-06-22 NOTE — PROGRESS NOTES
LOS: 1 day   Patient Care Team:  Asif Patricia MD as PCP - General (Internal Medicine)    Chief Complaint: Pain in the upper thoracic spine    Subjective     This patient has been having fairly severe pain in the upper part of his thoracic spine for several weeks now.  The pain is sharp and stabbing and quite severe.  He does not have any other symptoms.    Interval History:     History taken from: patient chart family    Objective      Patient has good movement in all 4 extremities    Vital Signs  Temp:  [97.7 °F (36.5 °C)-99 °F (37.2 °C)] 97.9 °F (36.6 °C)  Heart Rate:  [77-91] 91  Resp:  [16] 16  BP: (112-166)/(53-83) 120/77       Results Review:     I reviewed the patient's new clinical results.  I reviewed his CT scan which raises the question of discitis at T3-4.      Assessment & Plan       Discitis of thoracic region    Essential hypertension    Peripheral vascular disease    Gastroesophageal reflux disease    History of MRSA infection    Obstructive sleep apnea of adult    Type 2 diabetes mellitus with other specified complication, without long-term current use of insulin    Opioid dependence    Subacute osteomyelitis of left ankle    Discitis      I told the patient and his wife that we really need to get an MRI.  Once we see that we can make further decisions.      Samuel Jasso MD  06/22/24  11:28 EDT

## 2024-06-23 LAB
ANION GAP SERPL CALCULATED.3IONS-SCNC: 7 MMOL/L (ref 5–15)
BASOPHILS # BLD AUTO: 0.03 10*3/MM3 (ref 0–0.2)
BASOPHILS NFR BLD AUTO: 0.6 % (ref 0–1.5)
BUN SERPL-MCNC: 13 MG/DL (ref 8–23)
BUN/CREAT SERPL: 15.1 (ref 7–25)
CALCIUM SPEC-SCNC: 9.1 MG/DL (ref 8.6–10.5)
CHLORIDE SERPL-SCNC: 104 MMOL/L (ref 98–107)
CO2 SERPL-SCNC: 28 MMOL/L (ref 22–29)
CREAT SERPL-MCNC: 0.86 MG/DL (ref 0.76–1.27)
DEPRECATED RDW RBC AUTO: 41.3 FL (ref 37–54)
EGFRCR SERPLBLD CKD-EPI 2021: 90.3 ML/MIN/1.73
EOSINOPHIL # BLD AUTO: 0.25 10*3/MM3 (ref 0–0.4)
EOSINOPHIL NFR BLD AUTO: 4.6 % (ref 0.3–6.2)
ERYTHROCYTE [DISTWIDTH] IN BLOOD BY AUTOMATED COUNT: 13.4 % (ref 12.3–15.4)
GLUCOSE BLDC GLUCOMTR-MCNC: 116 MG/DL (ref 70–130)
GLUCOSE SERPL-MCNC: 114 MG/DL (ref 65–99)
HCT VFR BLD AUTO: 29.7 % (ref 37.5–51)
HGB BLD-MCNC: 9.6 G/DL (ref 13–17.7)
IMM GRANULOCYTES # BLD AUTO: 0.02 10*3/MM3 (ref 0–0.05)
IMM GRANULOCYTES NFR BLD AUTO: 0.4 % (ref 0–0.5)
LYMPHOCYTES # BLD AUTO: 1.17 10*3/MM3 (ref 0.7–3.1)
LYMPHOCYTES NFR BLD AUTO: 21.5 % (ref 19.6–45.3)
MCH RBC QN AUTO: 27.7 PG (ref 26.6–33)
MCHC RBC AUTO-ENTMCNC: 32.3 G/DL (ref 31.5–35.7)
MCV RBC AUTO: 85.8 FL (ref 79–97)
MONOCYTES # BLD AUTO: 0.44 10*3/MM3 (ref 0.1–0.9)
MONOCYTES NFR BLD AUTO: 8.1 % (ref 5–12)
NEUTROPHILS NFR BLD AUTO: 3.53 10*3/MM3 (ref 1.7–7)
NEUTROPHILS NFR BLD AUTO: 64.8 % (ref 42.7–76)
NRBC BLD AUTO-RTO: 0 /100 WBC (ref 0–0.2)
PLATELET # BLD AUTO: 223 10*3/MM3 (ref 140–450)
PMV BLD AUTO: 8.1 FL (ref 6–12)
POTASSIUM SERPL-SCNC: 4.3 MMOL/L (ref 3.5–5.2)
RBC # BLD AUTO: 3.46 10*6/MM3 (ref 4.14–5.8)
SODIUM SERPL-SCNC: 139 MMOL/L (ref 136–145)
VANCOMYCIN TROUGH SERPL-MCNC: 10.2 MCG/ML (ref 5–20)
WBC NRBC COR # BLD AUTO: 5.44 10*3/MM3 (ref 3.4–10.8)

## 2024-06-23 PROCEDURE — 80202 ASSAY OF VANCOMYCIN: CPT | Performed by: HOSPITALIST

## 2024-06-23 PROCEDURE — 82948 REAGENT STRIP/BLOOD GLUCOSE: CPT

## 2024-06-23 PROCEDURE — 25010000002 VANCOMYCIN 1 G RECONSTITUTED SOLUTION 1 EACH VIAL: Performed by: STUDENT IN AN ORGANIZED HEALTH CARE EDUCATION/TRAINING PROGRAM

## 2024-06-23 PROCEDURE — 25010000002 VANCOMYCIN 750 MG RECONSTITUTED SOLUTION 1 EACH VIAL: Performed by: HOSPITALIST

## 2024-06-23 PROCEDURE — 25810000003 SODIUM CHLORIDE 0.9 % SOLUTION 250 ML FLEX CONT: Performed by: STUDENT IN AN ORGANIZED HEALTH CARE EDUCATION/TRAINING PROGRAM

## 2024-06-23 PROCEDURE — 99231 SBSQ HOSP IP/OBS SF/LOW 25: CPT | Performed by: NURSE PRACTITIONER

## 2024-06-23 PROCEDURE — 99233 SBSQ HOSP IP/OBS HIGH 50: CPT | Performed by: INTERNAL MEDICINE

## 2024-06-23 PROCEDURE — 99222 1ST HOSP IP/OBS MODERATE 55: CPT | Performed by: INTERNAL MEDICINE

## 2024-06-23 PROCEDURE — 85025 COMPLETE CBC W/AUTO DIFF WBC: CPT | Performed by: NURSE PRACTITIONER

## 2024-06-23 PROCEDURE — 97161 PT EVAL LOW COMPLEX 20 MIN: CPT

## 2024-06-23 PROCEDURE — 25810000003 SODIUM CHLORIDE 0.9 % SOLUTION 250 ML FLEX CONT: Performed by: HOSPITALIST

## 2024-06-23 PROCEDURE — 36415 COLL VENOUS BLD VENIPUNCTURE: CPT | Performed by: NURSE PRACTITIONER

## 2024-06-23 PROCEDURE — 97530 THERAPEUTIC ACTIVITIES: CPT

## 2024-06-23 PROCEDURE — 87040 BLOOD CULTURE FOR BACTERIA: CPT | Performed by: INTERNAL MEDICINE

## 2024-06-23 PROCEDURE — 25010000002 HYDROMORPHONE PER 4 MG: Performed by: HOSPITALIST

## 2024-06-23 PROCEDURE — 80048 BASIC METABOLIC PNL TOTAL CA: CPT | Performed by: NURSE PRACTITIONER

## 2024-06-23 RX ORDER — BISACODYL 5 MG/1
5 TABLET, DELAYED RELEASE ORAL DAILY PRN
Status: DISCONTINUED | OUTPATIENT
Start: 2024-06-23 | End: 2024-06-27 | Stop reason: HOSPADM

## 2024-06-23 RX ORDER — POLYETHYLENE GLYCOL 3350 17 G/17G
17 POWDER, FOR SOLUTION ORAL DAILY PRN
Status: DISCONTINUED | OUTPATIENT
Start: 2024-06-23 | End: 2024-06-27 | Stop reason: HOSPADM

## 2024-06-23 RX ORDER — BISACODYL 10 MG
10 SUPPOSITORY, RECTAL RECTAL DAILY PRN
Status: DISCONTINUED | OUTPATIENT
Start: 2024-06-23 | End: 2024-06-27 | Stop reason: HOSPADM

## 2024-06-23 RX ORDER — AMOXICILLIN 250 MG
2 CAPSULE ORAL 2 TIMES DAILY
Status: DISCONTINUED | OUTPATIENT
Start: 2024-06-23 | End: 2024-06-27 | Stop reason: HOSPADM

## 2024-06-23 RX ADMIN — VANCOMYCIN HYDROCHLORIDE 750 MG: 750 INJECTION, POWDER, LYOPHILIZED, FOR SOLUTION INTRAVENOUS at 12:21

## 2024-06-23 RX ADMIN — HYDROMORPHONE HYDROCHLORIDE 0.5 MG: 1 INJECTION, SOLUTION INTRAMUSCULAR; INTRAVENOUS; SUBCUTANEOUS at 14:48

## 2024-06-23 RX ADMIN — HYDROMORPHONE HYDROCHLORIDE 0.5 MG: 1 INJECTION, SOLUTION INTRAMUSCULAR; INTRAVENOUS; SUBCUTANEOUS at 23:18

## 2024-06-23 RX ADMIN — HYDROMORPHONE HYDROCHLORIDE 0.5 MG: 1 INJECTION, SOLUTION INTRAMUSCULAR; INTRAVENOUS; SUBCUTANEOUS at 19:16

## 2024-06-23 RX ADMIN — OXYCODONE AND ACETAMINOPHEN 1 TABLET: 7.5; 325 TABLET ORAL at 16:34

## 2024-06-23 RX ADMIN — OXYCODONE AND ACETAMINOPHEN 1 TABLET: 7.5; 325 TABLET ORAL at 20:41

## 2024-06-23 RX ADMIN — SENNOSIDES AND DOCUSATE SODIUM 2 TABLET: 50; 8.6 TABLET ORAL at 12:20

## 2024-06-23 RX ADMIN — OXYCODONE AND ACETAMINOPHEN 1 TABLET: 7.5; 325 TABLET ORAL at 07:06

## 2024-06-23 RX ADMIN — OXYCODONE AND ACETAMINOPHEN 1 TABLET: 7.5; 325 TABLET ORAL at 02:25

## 2024-06-23 RX ADMIN — HYDROMORPHONE HYDROCHLORIDE 0.5 MG: 1 INJECTION, SOLUTION INTRAMUSCULAR; INTRAVENOUS; SUBCUTANEOUS at 09:57

## 2024-06-23 RX ADMIN — VANCOMYCIN HYDROCHLORIDE 1000 MG: 1 INJECTION, POWDER, LYOPHILIZED, FOR SOLUTION INTRAVENOUS at 23:18

## 2024-06-23 RX ADMIN — LIDOCAINE 1 PATCH: 4 PATCH TOPICAL at 09:42

## 2024-06-23 RX ADMIN — OXYCODONE AND ACETAMINOPHEN 1 TABLET: 7.5; 325 TABLET ORAL at 12:20

## 2024-06-23 RX ADMIN — GABAPENTIN 300 MG: 300 CAPSULE ORAL at 20:41

## 2024-06-23 RX ADMIN — SENNOSIDES AND DOCUSATE SODIUM 2 TABLET: 50; 8.6 TABLET ORAL at 20:41

## 2024-06-23 RX ADMIN — METOPROLOL SUCCINATE 50 MG: 50 TABLET, FILM COATED, EXTENDED RELEASE ORAL at 09:42

## 2024-06-23 NOTE — PROGRESS NOTES
[facility] THORACIC/LUMBAR NEUROSURGERY PROGRESS NOTE      CC: upper back pain      Subjective     Interval History: had MRI thoracic.  Positive MRSA on blood culture.  Remains on vancomycin.  Still with upper back pain    ROS:  Constitutional: No fever, chills  MS: Thoracic back pain  Neuro: No numbness, tingling, or weakness,  no balance difficulties  : No difficulty voiding, no incontinence    Objective     Vital signs in last 24 hours:  Temp:  [96.8 °F (36 °C)-97.9 °F (36.6 °C)] 96.8 °F (36 °C)  Heart Rate:  [73-91] 73  Resp:  [16] 16  BP: (120-145)/(67-77) 123/71    Intake/Output this shift:  I/O this shift:  In: 240 [P.O.:240]  Out: -     LABS:  Results from last 7 days   Lab Units 06/23/24  0608 06/22/24  0248 06/21/24  0800   WBC 10*3/mm3 5.44 4.99 6.34   HEMOGLOBIN g/dL 9.6* 9.6* 11.1*   HEMATOCRIT % 29.7* 29.4* 33.9*   PLATELETS 10*3/mm3 223 188 245     Results from last 7 days   Lab Units 06/23/24  0608 06/22/24  0248 06/21/24  0800   SODIUM mmol/L 139 138 138   POTASSIUM mmol/L 4.3 4.2 4.2   CHLORIDE mmol/L 104 103 100   CO2 mmol/L 28.0 28.0 30.0*   BUN mg/dL 13 12 15   CREATININE mg/dL 0.86 0.87 0.91   CALCIUM mg/dL 9.1 8.9 9.5   BILIRUBIN mg/dL  --   --  0.8   ALK PHOS U/L  --   --  226*   ALT (SGPT) U/L  --   --  11   AST (SGOT) U/L  --   --  18   GLUCOSE mg/dL 114* 94 114*     Blood culture 6/21 MRSA  Blood culture 6/23-pending    IMAGING STUDIES:  MRI thoracic spine reveals T1 hypointense, T2 hyperintense signal and postcontrast enhancement within the T3, T4 vertebral bodies and adjoining disc space, consistent with discitisosteomyelitis.  No evidence of fracture.  No cord signal changes.  Does appear to be collection of epidural abscess versus phlegmon right anterior canal at T3/4 resulting in mild to moderate right-sided canal and foraminal narrowing.  There is soft tissue collection anterior to the vertebral bodies from the T2/3 to Just below the T4/5 disc space.    I personally viewed and  interpreted the patient's MRI thoracic.  Also reviewed by and discussed with Dr. Jasso.    Meds reviewed/changed: Yes  Gabapentin 300 mg p.o. nightly  Lidocaine patch  Vancomycin IV per pharmacy dosing  Dilaudid 0.5 mg IV every 4 as needed-1 dose  Percocet 7.5 mg p.o. every 4 as needed-4 doses    Physical Exam:    General:   Awake, alert.  Sitting up in   Back:    - SLR  Motor:    Normal muscle strength, bulk and tone in lower extremities.   Sensation:   Normal to light touch shaq LEs  Station and Gait:             Per PT note 6/23-completed supine to sit, STS txf, and ambulated in room c RW requiring SBA. Slow pace but overall steady gait; mobility limited due to pain.    Extremities:   CAM Walker boot on left lower extremity      Assessment & Plan     ASSESSMENT:      Discitis of thoracic region    Essential hypertension    Peripheral vascular disease    Gastroesophageal reflux disease    History of MRSA infection    Obstructive sleep apnea of adult    Type 2 diabetes mellitus with other specified complication, without long-term current use of insulin    Opioid dependence    Subacute osteomyelitis of left ankle    Discitis    Patient with upper back pain and history of MRSA infection completed MRI study.  This does confirm osteomyelitis/discitis at T2/3.  There is a small right-sided epidural component mild to moderate right-sided canal stenosis and foraminal narrowing.  No cord signal change.  No fractures.  Blood cultures now positive for MRSA.  On vancomycin.  Initial plan was for CT-guided disc aspiration, but with identification of bacteria on blood culture, no need at this time.  He has good strength and normal sensation.  No need for surgical intervention acutely.  Will follow for any changes.  Agree with plan for MARCELLUS.    PLAN:   No plans for surgical intervention at this time  Antibiotics per ID service-  Will check in tomorrow, likely sign off and arrange outpatient follow-up    I discussed the patient's  "findings and my recommendations with patient, family, nursing staff, and Dr. Brown and Dr. Jasso    During patient visit, I utilized appropriate personal protective equipment including  gown and gloves. Appropriate PPE was worn during the entire visit.  Hand hygiene was completed before and after.      LOS: 2 days       Donna Saenz, APRN  6/23/2024  09:06 EDT    \"Dictated utilizing Dragon dictation\".      "

## 2024-06-23 NOTE — PLAN OF CARE
Goal Outcome Evaluation:  Plan of Care Reviewed With: patient        Progress: improving  Outcome Evaluation: Patient here with back pain. VSS. PO/IV pain medication helping with pain. Up with assistance. Voiding ine per urinal. Patient in contact isolation for MRSA.  MRI completed. Wound care consulted. Education provided on pain control and safety . Patient verbalized understanding.

## 2024-06-23 NOTE — CONSULTS
Date of Hospital Visit: 2024  Date of consult:  2024  Encounter Provider: Jose Rice MD  Place of Service: [facility] CARDIOLOGY  Patient Name: Onel Campbell  :1950  Referral Provider: Samuel Dietrich MD    Chief complaint: Upper back pain suspected infected endocarditis    Reason for consult:     History of Present Illness  Mr. Campbell is a pleasant 74 years old gentleman with past medical history of MRSA bacteremia/sepsis from infected wound, diabetes with complications, hypertension, peripheral arterial disease admitted with severe upper back pain and noted to have thoracic discitis with some epidural abscess.  He denies any cardiac specific symptoms.      Past Medical History:   Diagnosis Date    Diabetes     GERD (gastroesophageal reflux disease)     Hypertension     Prostate cancer     Renal disease     Sleep apnea        No past surgical history on file.    Medications Prior to Admission   Medication Sig Dispense Refill Last Dose    carvedilol (COREG) 12.5 MG tablet Take 1 tablet by mouth 2 (Two) Times a Day With Meals.   2024 at 0800    empagliflozin (Jardiance) 10 MG tablet tablet Take 1 tablet by mouth Daily. 90 tablet 3 2024 at 0800    metoprolol succinate XL (TOPROL-XL) 50 MG 24 hr tablet Take 1 tablet by mouth Daily. Do not crush or chew. 30 tablet 5 2024 at 0800    sulfamethoxazole-trimethoprim (BACTRIM DS,SEPTRA DS) 800-160 MG per tablet Take 1 tablet by mouth every 12 (twelve) hours. (Patient taking differently: Take 1 tablet by mouth Daily.) 60 tablet 0 2024 at 0800    Tirzepatide (Mounjaro) 12.5 MG/0.5ML solution pen-injector pen Inject 0.5 mL under the skin into the appropriate area as directed 1 (One) Time Per Week. 2 mL 3 2024 at 0800    bisacodyl (DULCOLAX) 10 MG suppository Insert 1 suppository into the rectum Daily. 10 suppository 0     gabapentin (NEURONTIN) 300 MG capsule Take 1 capsule by mouth Every  "Night.       Tirzepatide (Mounjaro) 15 MG/0.5ML solution pen-injector pen Inject 0.5 mL under the skin into the appropriate area as directed 1 (One) Time Per Week. 2 mL 1        Current Meds  Scheduled Meds:gabapentin, 300 mg, Oral, Nightly  insulin lispro, 2-9 Units, Subcutaneous, 4x Daily AC & at Bedtime  Lidocaine, 1 patch, Transdermal, Q24H  metoprolol succinate XL, 50 mg, Oral, Daily  senna-docusate sodium, 2 tablet, Oral, BID  vancomycin, 750 mg, Intravenous, Q12H      Continuous Infusions:Pharmacy to dose vancomycin,       PRN Meds:.  acetaminophen **OR** [DISCONTINUED] acetaminophen **OR** [DISCONTINUED] acetaminophen    senna-docusate sodium **AND** polyethylene glycol **AND** bisacodyl **AND** bisacodyl    dextrose    dextrose    glucagon (human recombinant)    HYDROmorphone    oxyCODONE-acetaminophen    Pharmacy to dose vancomycin    Allergies as of 06/21/2024    (No Known Allergies)       Social History     Socioeconomic History    Marital status:        History reviewed. No pertinent family history.    REVIEW OF SYSTEMS:   All systems reviewed and pertinent positives include in HPI otherwise negative review of systems.       Objective:   Temp:  [96.8 °F (36 °C)-98 °F (36.7 °C)] 98 °F (36.7 °C)  Heart Rate:  [69-90] 69  Resp:  [16] 16  BP: (121-145)/(67-74) 130/68  Body mass index is 24.3 kg/m².  Flowsheet Rows      Flowsheet Row First Filed Value   Admission Height 182.8 cm (71.97\") Documented at 06/21/2024 1244   Admission Weight 81.2 kg (179 lb) Documented at 06/21/2024 1111          Vitals:    06/23/24 1041   BP: 130/68   Pulse: 69   Resp: 16   Temp: 98 °F (36.7 °C)   SpO2: 98%       General Appearance:    Alert, cooperative, in no acute distress   Head:    Normocephalic, without obvious abnormality, atraumatic   Eyes:            Lids and lashes normal, conjunctivae and sclerae normal, no   icterus, no pallor, corneas clear, PERRLA   Ears:    Ears appear intact with no abnormalities noted "   Throat:   No oral lesions, no thrush, oral mucosa moist   Neck:   No adenopathy, supple, trachea midline, no thyromegaly, no   carotid bruit, no JVD   Back:     No kyphosis present, no scoliosis present, no skin lesions, erythema or scars, no tenderness to percussion or palpation, range of motion normal   Lungs:     Clear to auscultation,respirations regular, even and unlabored    Heart:    Regular rhythm and normal rate, normal S1 and S2, no murmur, no gallop, no rub, no click   Chest Wall:    No abnormalities observed   Abdomen:     Normal bowel sounds, no masses, no organomegaly, soft nontender, nondistended, no guarding, no rebound  tenderness   Extremities:   Moves all extremities well, no edema, no cyanosis, no redness   Pulses:   Pulses palpable and equal bilaterally. Normal radial, carotid, femoral, dorsalis pedis and posterior tibial pulses bilaterally. Normal abdominal aorta   Skin:  Neurology:   Psychiatric:   No bleeding, bruising or rash   Normal speech and cranial nerve exam, no focal deficit   Alert and oriented x 3, normal mood and affect             Review of Data:      Results from last 7 days   Lab Units 06/23/24  0608 06/22/24 0248 06/21/24  0800   SODIUM mmol/L 139   < > 138   POTASSIUM mmol/L 4.3   < > 4.2   CHLORIDE mmol/L 104   < > 100   CO2 mmol/L 28.0   < > 30.0*   BUN mg/dL 13   < > 15   CREATININE mg/dL 0.86   < > 0.91   CALCIUM mg/dL 9.1   < > 9.5   BILIRUBIN mg/dL  --   --  0.8   ALK PHOS U/L  --   --  226*   ALT (SGPT) U/L  --   --  11   AST (SGOT) U/L  --   --  18   GLUCOSE mg/dL 114*   < > 114*    < > = values in this interval not displayed.         @[email or web address]@  Results from last 7 days   Lab Units 06/23/24  0608 06/22/24  0248 06/21/24  0800   WBC 10*3/mm3 5.44 4.99 6.34   HEMOGLOBIN g/dL 9.6* 9.6* 11.1*   HEMATOCRIT % 29.7* 29.4* 33.9*   PLATELETS 10*3/mm3 223 188 245             @[facility](chol,trig,hdl,ldl)    I personally viewed and interpreted the patient's EKG/Telemetry  data  )   Discitis of thoracic region    Essential hypertension    Peripheral vascular disease    Gastroesophageal reflux disease    History of MRSA infection    Obstructive sleep apnea of adult    Type 2 diabetes mellitus with other specified complication, without long-term current use of insulin    Opioid dependence    Subacute osteomyelitis of left ankle    Discitis        Assessment and Plan:    MRSA bacteremia with thoracic discitis and small epidural abscess-presented with severe back pain in thoracic area.  Essential hypertension  Diabetes mellitus with complications-charcoaled foot, infected wound left foot  Hypercholesterolemia  Peripheral arterial disease    Get transthoracic echo.  In the absence of valve destruction or dysfunction MARCELLUS unlikely to change care if patient is going to be treated with 4-6 weeks of IV antibiotic for discitis  Communicate with ID     Jose Rice MD  06/23/24  10:59 EDT.      Time spent in reviewing chart, discussion and examination:

## 2024-06-23 NOTE — PROGRESS NOTES
ID note for MRSA septicemia    Subjective: Says back pain is stable.  He has chronic pain in his left foot which has not changed..  Afebrile.  Tolerating antibiotics.    Objective:   Temp:  [96.8 °F (36 °C)-97.9 °F (36.6 °C)] 96.8 °F (36 °C)  Heart Rate:  [73-90] 73  Resp:  [16] 16  BP: (121-145)/(67-74) 123/71  GENERAL: Awake and alert, in no acute distress.   HEENT: Oropharynx is clear. Hearing is grossly normal.   EYES: . No conjunctival injection. No lid lag.   LUNGS:normal respiratory effort.   SKIN: no cutaneous eruptions in exposed areas  PSYCHIATRIC: Appropriate mood, affect, insight, and judgment.     White count 5.4  Creatinine 0.86  Glucose 114-147    6/21 blood cultures 2/2 MRSA  6/23 blood cultures pending    Assessment and plan  1.  MRSA bacteremia  2.  Left foot osteomyelitis in the setting of hardware infection on suppressive Bactrim  3.  Diabetes mellitus type 2  4.  thoracic discitis    Blood cultures now positive for MRSA.  Continue vancomycin for AUC of 400-600.  Check trough ahead of next dose  MRI with thoracic discitis and small epidural abscess.  Discussed with neurosurgery and planning nonoperative management for now.  Now that organism determined I do not think CT aspiration of the disc space needed.  Repeat blood cultures pending.  Cardiology consulted for MARCELLUS given recurrent MRSA bacteremia  Discussed with Dr. Goldsmith regarding impression and plans

## 2024-06-23 NOTE — PLAN OF CARE
Goal Outcome Evaluation:  Plan of Care Reviewed With: patient, spouse           Outcome Evaluation: Pt is a 75 y/o M admitted to [facility] with c/p upper back pain with concern with discitis of thoracic region. Pt has a past med hx of left foot osteomyelitis with hardware infection and MRSA infection. Pt currently is WBAT with CAM boot on L LE. Pt reports he lives with his spouse with 3 NELA and has been using a RW for mobility at baseline. Pt presents to PT with back pain and generalized weakness. Pt completed supine to sit, STS txf, and ambulated in room c RW requiring SBA. Slow pace but overall steady gait; mobility limited due to pain. Pt UIC and encouraged to ambulate with spouse or staff throughout the day. PT recommends home with spouse and f/u with outpatient PT.      Anticipated Discharge Disposition (PT): home with assist, home with outpatient therapy services

## 2024-06-23 NOTE — PROGRESS NOTES
Name: Onel Campbell ADMIT: 2024   : 1950  PCP: Asif Patricia MD    MRN: [id number] LOS: 2 days   AGE/SEX: 74 y.o. male  ROOM: [facility]     Subjective   Subjective   Patient resting comfortably bed.  Continues to have intermittent back pain but that is improving.  Discussed MRI findings with patient and family.  Discussed need for MARCELLUS.    Review of Systems   Constitutional:  Negative for chills and fever.   Respiratory:  Negative for cough and shortness of breath.    Cardiovascular:  Negative for chest pain and palpitations.   Gastrointestinal:  Negative for abdominal pain, diarrhea, nausea and vomiting.   Musculoskeletal:  Positive for back pain.     As above     Objective   Objective   Vital Signs  Temp:  [96.8 °F (36 °C)-97.9 °F (36.6 °C)] 96.8 °F (36 °C)  Heart Rate:  [73-90] 73  Resp:  [16] 16  BP: (121-145)/(67-74) 123/71  SpO2:  [98 %-99 %] 99 %  on   ;   Device (Oxygen Therapy): CPAP  Body mass index is 24.3 kg/m².  Physical Exam  Vitals and nursing note reviewed.   Constitutional:       General: He is not in acute distress.  Cardiovascular:      Rate and Rhythm: Normal rate and regular rhythm.   Pulmonary:      Effort: Pulmonary effort is normal. No respiratory distress.   Abdominal:      General: Abdomen is flat. There is no distension.      Tenderness: There is no abdominal tenderness.   Musculoskeletal:         General: No swelling or deformity.   Skin:     General: Skin is warm and dry.   Neurological:      General: No focal deficit present.      Mental Status: He is alert. Mental status is at baseline.      Sensory: No sensory deficit.      Motor: No weakness.         Results Review     I reviewed the patient's new clinical results.  Results from last 7 days   Lab Units 24  0608 24  0248 24  0800   WBC 10*3/mm3 5.44 4.99 6.34   HEMOGLOBIN g/dL 9.6* 9.6* 11.1*   PLATELETS 10*3/mm3 223 188 245     Results from last 7 days   Lab Units 24  0608  "06/22/24  0248 06/21/24  0800   SODIUM mmol/L 139 138 138   POTASSIUM mmol/L 4.3 4.2 4.2   CHLORIDE mmol/L 104 103 100   CO2 mmol/L 28.0 28.0 30.0*   BUN mg/dL 13 12 15   CREATININE mg/dL 0.86 0.87 0.91   GLUCOSE mg/dL 114* 94 114*   Estimated Creatinine Clearance: 85.2 mL/min (by C-G formula based on SCr of 0.86 mg/dL).  Results from last 7 days   Lab Units 06/21/24  0800   ALBUMIN g/dL 3.6   BILIRUBIN mg/dL 0.8   ALK PHOS U/L 226*   AST (SGOT) U/L 18   ALT (SGPT) U/L 11     Results from last 7 days   Lab Units 06/23/24  0608 06/22/24  0248 06/21/24  0800   CALCIUM mg/dL 9.1 8.9 9.5   ALBUMIN g/dL  --   --  3.6     Results from last 7 days   Lab Units 06/21/24  1432 06/21/24  1107   LACTATE mmol/L 1.0 2.2*   No results found for: \"COVID19\"  Glucose   Date/Time Value Ref Range Status   06/23/2024 0709 116 70 - 130 mg/dL Final   06/22/2024 2029 147 (H) 70 - 130 mg/dL Final   06/22/2024 1725 118 70 - 130 mg/dL Final   06/22/2024 1125 126 70 - 130 mg/dL Final   06/22/2024 0730 111 70 - 130 mg/dL Final   06/21/2024 2059 107 70 - 130 mg/dL Final   06/21/2024 1827 91 70 - 130 mg/dL Final       MRI Thoracic Spine With & Without Contrast  Narrative: THORACIC SPINE MRI WITHOUT AND WITH GADOLINIUM     HISTORY: rule out osteomylitis; M46.44-Discitis, unspecified, thoracic  region     COMPARISON: June 21, 2024.     FINDINGS:  Multiplanar images of the thoracic spine obtained without and  with gadolinium.  Axial images obtained from T1-S1.     There is abnormal T1 hypointense/T2 hyperintense signal identified  within the T3 and T4 vertebral bodies, along with some fluid within the  intervertebral disc space, concerning for osteomyelitis/discitis.  Following contrast administration, there is avid enhancement within the  vertebral bodies, and paraspinous tissues on the right. No additional  areas of osteomyelitis/discitis are seen. No acute fracture or  subluxation is seen. Cord signal appears normal. I do think there is " an  epidural abscess/phlegmon anteriorly on the right at T3-T4, which  measures up to 1.9 cm in craniocaudal dimensions, and up to 3 mm in AP  dimensions. This is resulting in some moderate canal narrowing on the  right, and some narrowing of the right neural foramen. Patient also  appears to have some disc bulge at T7-T8, which results in effacement of  the thecal sac anteriorly. There is also some mild neuroforaminal  narrowing on the left. Further disc bulges noted at T10-T11, again, with  some effacement of the anterior aspect of the thecal sac.     Impression: 1. Osteomyelitis/discitis noted at T3-T4. There is extensive  inflammation within the paraspinous soft tissues, favored to represent  phlegmon. The patient also appears to have a small abscess within the  right anterior epidural space at T3-T4, which measures approximately 3  mm in AP dimensions and centimeters in craniocaudal dimensions. This is  resulting in moderate canal narrowing on the right at this level. There  is also some involvement of the right neural foramen.     This report was finalized on 6/23/2024 4:16 AM by Dr. Louisa Carter M.D on Workstation: [id number]       I reviewed the patient's daily medications.  Scheduled Medications  gabapentin, 300 mg, Oral, Nightly  insulin lispro, 2-9 Units, Subcutaneous, 4x Daily AC & at Bedtime  Lidocaine, 1 patch, Transdermal, Q24H  metoprolol succinate XL, 50 mg, Oral, Daily  vancomycin, 750 mg, Intravenous, Q12H    Infusions  Pharmacy to dose vancomycin,     Diet  Diet: Cardiac, Diabetic; Healthy Heart (2-3 Na+); Consistent Carbohydrate; Fluid Consistency: Thin (IDDSI 0)         I have personally reviewed:  [x]  Laboratory   [x]  Microbiology   [x]  Radiology   []  EKG/Telemetry   []  Cardiology/Vascular   []  Pathology   []  Records     Assessment/Plan     Active Hospital Problems    Diagnosis  POA    **Discitis of thoracic region [M46.44]  Yes    Opioid dependence [F11.20]  Yes    Subacute  osteomyelitis of left ankle [M86.272]  Yes    Discitis [M46.40]  Yes    Obstructive sleep apnea of adult [G47.33]  Yes    Type 2 diabetes mellitus with other specified complication, without long-term current use of insulin [E11.69]  Yes    History of MRSA infection [Z86.14]  Yes    Peripheral vascular disease [I73.9]  Yes    Essential hypertension [I10]  Yes    Gastroesophageal reflux disease [K21.9]  Yes      Resolved Hospital Problems   No resolved problems to display.       74 y.o. male admitted with Discitis of thoracic region.    MSRA bacteremia  Left foot osteomyelitis with hardware infection on suppressive Bactrim  T3-T4 discitis with small abscess  -Discussed with infectious disease, continue vancomycin.  Plan for MARCELLUS.  Cardiology has been consulted  -Neurosurgery consulted, no surgery needed.  Plan to follow-up as an outpatient.  -Continue pain regimen.  Bowel regimen scheduled  -Repeat blood cultures pending    Hypertension  -Coreg previously discontinued and then started on metoprolol's for rebound tachycardia as outpatient office    Diabetes type 2 with neuropathy  -On Mounjaro and Jardiance as an outpatient  -Accu-Cheks have been all at goal since admission, discontinue Accu-Chek  -Continue gabapentin    SCDs for DVT prophylaxis.  Full code.  Discussed with patient, family, and nursing staff.  Anticipate discharge home with family timing yet to be determined.    Expected Discharge Date: 6/25/2024; Expected Discharge Time:       Yomi Goldsmith MD  [facility]  06/23/24  10:29 EDT

## 2024-06-23 NOTE — THERAPY EVALUATION
Patient Name: Onel Campbell  : 1950    MRN: [id number]                              Today's Date: 2024       Admit Date: 2024    Visit Dx:     ICD-10-CM ICD-9-CM   1. Discitis of thoracic region  M46.44 722.92     Patient Active Problem List   Diagnosis    Stage 3 chronic kidney disease    Drug dependence    Charcot's joint of foot    Essential hypertension    Peripheral vascular disease    Gastroesophageal reflux disease    History of prostate cancer    History of MRSA infection    Obstructive sleep apnea of adult    Discitis of thoracic region    Type 2 diabetes mellitus with other specified complication, without long-term current use of insulin    Opioid dependence    Subacute osteomyelitis of left ankle    Discitis     Past Medical History:   Diagnosis Date    Diabetes     GERD (gastroesophageal reflux disease)     Hypertension     Prostate cancer     Renal disease     Sleep apnea      No past surgical history on file.   General Information       Row Name 24 0928          Physical Therapy Time and Intention    Document Type evaluation  -CS     Mode of Treatment individual therapy;physical therapy  -CS       Row Name 2428          General Information    Patient Profile Reviewed yes  -CS     Prior Level of Function independent:;gait;transfer;bed mobility  RW  -CS     Existing Precautions/Restrictions no known precautions/restrictions  -CS     Barriers to Rehab none identified  -CS       Row Name 2428          Living Environment    People in Home spouse  -CS       Row Name 2428          Home Main Entrance    Number of Stairs, Main Entrance three  -CS     Stair Railings, Main Entrance none  -CS       Row Name 2428          Stairs Within Home, Primary    Number of Stairs, Within Home, Primary none  -CS       Row Name 2428          Cognition    Orientation Status (Cognition) oriented x 4  -CS       Row Name 2428          Safety Issues,  Functional Mobility    Impairments Affecting Function (Mobility) pain;strength  -CS               User Key  (r) = Recorded By, (t) = Taken By, (c) = Cosigned By      Initials Name Provider Type    CS Joellen Hua, PT Physical Therapist                   Mobility       Row Name 06/23/24 0928          Bed Mobility    Bed Mobility supine-sit  -CS     Supine-Sit Opal (Bed Mobility) supervision  -CS     Assistive Device (Bed Mobility) head of bed elevated  -CS     Comment, (Bed Mobility) UIC at end of session  -CS       Row Name 06/23/24 0928          Sit-Stand Transfer    Sit-Stand Opal (Transfers) standby assist  -CS     Assistive Device (Sit-Stand Transfers) walker, front-wheeled  -CS       Row Name 06/23/24 0928          Gait/Stairs (Locomotion)    Opal Level (Gait) standby assist  -CS     Assistive Device (Gait) walker, front-wheeled  -CS     Distance in Feet (Gait) 10  -CS     Deviations/Abnormal Patterns (Gait) pierre decreased  -CS     Left Sided Gait Deviations weight shift ability decreased  -CS     Opal Level (Stairs) not tested  -CS     Comment, (Gait/Stairs) slow pace; steady gait  -       Row Name 06/23/24 0928          Mobility    Extremity Weight-bearing Status left lower extremity  -CS     Left Lower Extremity (Weight-bearing Status) weight-bearing as tolerated (WBAT)  CAM boot  -CS               User Key  (r) = Recorded By, (t) = Taken By, (c) = Cosigned By      Initials Name Provider Type    CS Joellen Hua, PT Physical Therapist                   Obj/Interventions       Row Name 06/23/24 0929          Range of Motion Comprehensive    General Range of Motion bilateral lower extremity ROM WFL  -       Row Name 06/23/24 0929          Strength Comprehensive (MMT)    General Manual Muscle Testing (MMT) Assessment other (see comments)  -CS     Comment, General Manual Muscle Testing (MMT) Assessment B LE grossly 4-/5  -CS       Row Name 06/23/24 0929           Balance    Comment, Balance WFL  -CS               User Key  (r) = Recorded By, (t) = Taken By, (c) = Cosigned By      Initials Name Provider Type    CS Joellen Hua PT Physical Therapist                   Goals/Plan       Row Name 06/23/24 0933          Bed Mobility Goal 1 (PT)    Activity/Assistive Device (Bed Mobility Goal 1, PT) bed mobility activities, all  -CS     [place] Level/Cues Needed (Bed Mobility Goal 1, PT) independent  -CS     Time Frame (Bed Mobility Goal 1, PT) 2 weeks  -CS       Row Name 06/23/24 0933          Transfer Goal 1 (PT)    Activity/Assistive Device (Transfer Goal 1, PT) sit-to-stand/stand-to-sit;bed-to-chair/chair-to-bed  -CS     [place] Level/Cues Needed (Transfer Goal 1, PT) modified independence  -CS     Time Frame (Transfer Goal 1, PT) 2 weeks  -CS       Row Name 06/23/24 0933          Gait Training Goal 1 (PT)    Activity/Assistive Device (Gait Training Goal 1, PT) gait (walking locomotion);assistive device use  -CS     [place] Level (Gait Training Goal 1, PT) modified independence  -CS     Distance (Gait Training Goal 1, PT) 100'  -CS     Time Frame (Gait Training Goal 1, PT) 2 weeks  -CS               User Key  (r) = Recorded By, (t) = Taken By, (c) = Cosigned By      Initials Name Provider Type    Joellen Pulliam PT Physical Therapist                   Clinical Impression       Row Name 06/23/24 0929          Pain    Pretreatment Pain Rating 6/10  -CS     Posttreatment Pain Rating 6/10  -CS     Pain Location - back  -CS     Pain Intervention(s) Ambulation/increased activity;Rest;Repositioned  -CS       Row Name 06/23/24 0929          Plan of Care Review    Plan of Care Reviewed With patient;spouse  -CS     Outcome Evaluation Pt is a 75 y/o M admitted to [facility] with c/p upper back pain with concern with discitis of thoracic region. Pt has a past med hx of left foot osteomyelitis with hardware infection and MRSA infection. Pt currently is WBAT with CAM boot  on L LE. Pt reports he lives with his spouse with 3 NELA and has been using a RW for mobility at baseline. Pt presents to PT with back pain and generalized weakness. Pt completed supine to sit, STS txf, and ambulated in room c RW requiring SBA. Slow pace but overall steady gait; mobility limited due to pain. Pt UIC and encouraged to ambulate with spouse or staff throughout the day. PT recommends home with spouse and f/u with outpatient PT.  -CS       Row Name 06/23/24 0929          Therapy Assessment/Plan (PT)    Rehab Potential (PT) good, to achieve stated therapy goals  -CS     Criteria for Skilled Interventions Met (PT) yes;meets criteria  -CS     Therapy Frequency (PT) 3 times/wk  -CS       Row Name 06/23/24 0929          Positioning and Restraints    Pre-Treatment Position in bed  -CS     Post Treatment Position chair  -CS     In Chair reclined;call light within reach;encouraged to call for assist;with family/caregiver;notified nsg  no alarm - cleared to ambulate with spouse  -CS               User Key  (r) = Recorded By, (t) = Taken By, (c) = Cosigned By      Initials Name Provider Type    CS Joellen Hua, PT Physical Therapist                   Outcome Measures       Row Name 06/23/24 0933          How much help from another person do you currently need...    Turning from your back to your side while in flat bed without using bedrails? 4  -CS     Moving from lying on back to sitting on the side of a flat bed without bedrails? 4  -CS     Moving to and from a bed to a chair (including a wheelchair)? 4  -CS     Standing up from a chair using your arms (e.g., wheelchair, bedside chair)? 4  -CS     Climbing 3-5 steps with a railing? 3  -CS     To walk in hospital room? 4  -CS     AM-PAC 6 Clicks Score (PT) 23  -CS     Highest Level of Mobility Goal 7 --> Walk 25 feet or more  -CS       Row Name 06/23/24 0938          Functional Assessment    Outcome Measure Options AM-PAC 6 Clicks Basic Mobility (PT)  -CS                User Key  (r) = Recorded By, (t) = Taken By, (c) = Cosigned By      Initials Name Provider Type    CS Joellen Hua PT Physical Therapist                                 Physical Therapy Education       Title: PT OT SLP Therapies (Done)       Topic: Physical Therapy (Done)       Point: Mobility training (Done)       Learning Progress Summary             Patient Acceptance, E,TB, VU,DU,NR by CS at 6/23/2024 0934   Family Acceptance, E,TB, VU,DU,NR by CS at 6/23/2024 0934                         Point: Home exercise program (Done)       Learning Progress Summary             Patient Acceptance, E,TB, VU,DU,NR by CS at 6/23/2024 0934   Family Acceptance, E,TB, VU,DU,NR by CS at 6/23/2024 0934                         Point: Body mechanics (Done)       Learning Progress Summary             Patient Acceptance, E,TB, VU,DU,NR by  at 6/23/2024 0934   Family Acceptance, E,TB, VU,DU,NR by CS at 6/23/2024 0934                         Point: Precautions (Done)       Learning Progress Summary             Patient Acceptance, E,TB, VU,DU,NR by CS at 6/23/2024 0934   Family Acceptance, E,TB, VU,DU,NR by CS at 6/23/2024 0934                                         User Key       Initials Effective Dates Name Provider Type Discipline     09/22/22 -  Joellen Hua PT Physical Therapist PT                  PT Recommendation and Plan     Plan of Care Reviewed With: patient, spouse  Outcome Evaluation: Pt is a 75 y/o M admitted to [facility] with c/p upper back pain with concern with discitis of thoracic region. Pt has a past med hx of left foot osteomyelitis with hardware infection and MRSA infection. Pt currently is WBAT with CAM boot on L LE. Pt reports he lives with his spouse with 3 NELA and has been using a RW for mobility at baseline. Pt presents to PT with back pain and generalized weakness. Pt completed supine to sit, STS txf, and ambulated in room c RW requiring SBA. Slow pace but overall steady gait; mobility  limited due to pain. Pt UIC and encouraged to ambulate with spouse or staff throughout the day. PT recommends home with spouse and f/u with outpatient PT.     Time Calculation:         PT Charges       Row Name 06/23/24 0934             Time Calculation    Start Time 0907  -CS      Stop Time 0927  -CS      Time Calculation (min) 20 min  -CS      PT Received On 06/23/24  -CS      PT - Next Appointment 06/24/24  -CS      PT Goal Re-Cert Due Date 07/07/24  -CS         Time Calculation- PT    Total Timed Code Minutes- PT 16 minute(s)  -CS         Timed Charges    65543 - PT Therapeutic Activity Minutes 16  -CS         Total Minutes    Timed Charges Total Minutes 16  -CS       Total Minutes 16  -CS                User Key  (r) = Recorded By, (t) = Taken By, (c) = Cosigned By      Initials Name Provider Type    CS Joellen Hua, PT Physical Therapist                  Therapy Charges for Today       Code Description Service Date Service Provider Modifiers Qty    68443967826 HC PT THERAPEUTIC ACT EA 15 MIN 6/23/2024 Joellen Hua, PT GP 1    [id number]  PT EVAL LOW COMPLEXITY 3 6/23/2024 Joellen Hua, PT GP 1            PT G-Codes  Outcome Measure Options: AM-PAC 6 Clicks Basic Mobility (PT)  AM-PAC 6 Clicks Score (PT): 23  PT Discharge Summary  Anticipated Discharge Disposition (PT): home with assist, home with outpatient therapy services    Joellen Hua PT  6/23/2024

## 2024-06-23 NOTE — PROGRESS NOTES
[facility] Clinical Pharmacy Services: Vancomycin Level Monitoring Note    Onel Campbell is a 74 y.o. male who is on day 3/14 of pharmacy to dose vancomycin for Bone and/or Joint Infection.    Estimated Creatinine Clearance: 85.2 mL/min (by C-G formula based on SCr of 0.86 mg/dL).    Previous Vanc Dose: 750 mg IV every 12 hours    Results from last 7 days   Lab Units 06/23/24  1102   VANCOMYCIN TR mcg/mL 10.20     Will adjust dose to 1000 mg q12h for a new predicted AUC of 484 mg/L.hr starting tonight at 2300.    Next Level Date and Time: Vanc Trough on 6/25 @ 1030    Pharmacy is continuing to monitor and will adjust as needed.    Perez Moses [facility]  Clinical Pharmacist

## 2024-06-23 NOTE — PLAN OF CARE
Goal Outcome Evaluation:   Patient is Aox4. VSS on RA. Ambulating assist x1. PO/IV PRN medication required for pain management. Contact precautions for MRSA continued. Next Vanc trough Tuesday (6/25) at 1030. Cardiology consulted. All needs currently met, will continue to monitor.

## 2024-06-24 ENCOUNTER — APPOINTMENT (OUTPATIENT)
Dept: GENERAL RADIOLOGY | Facility: HOSPITAL | Age: 74
End: 2024-06-24
Payer: MEDICARE

## 2024-06-24 LAB
ANION GAP SERPL CALCULATED.3IONS-SCNC: 7 MMOL/L (ref 5–15)
BACTERIA SPEC AEROBE CULT: ABNORMAL
BACTERIA SPEC AEROBE CULT: ABNORMAL
BASOPHILS # BLD AUTO: 0.04 10*3/MM3 (ref 0–0.2)
BASOPHILS NFR BLD AUTO: 0.8 % (ref 0–1.5)
BUN SERPL-MCNC: 13 MG/DL (ref 8–23)
BUN/CREAT SERPL: 14.6 (ref 7–25)
CALCIUM SPEC-SCNC: 9 MG/DL (ref 8.6–10.5)
CHLORIDE SERPL-SCNC: 102 MMOL/L (ref 98–107)
CO2 SERPL-SCNC: 27 MMOL/L (ref 22–29)
CREAT SERPL-MCNC: 0.89 MG/DL (ref 0.76–1.27)
DEPRECATED RDW RBC AUTO: 43.1 FL (ref 37–54)
EGFRCR SERPLBLD CKD-EPI 2021: 89.4 ML/MIN/1.73
EOSINOPHIL # BLD AUTO: 0.29 10*3/MM3 (ref 0–0.4)
EOSINOPHIL NFR BLD AUTO: 5.6 % (ref 0.3–6.2)
ERYTHROCYTE [DISTWIDTH] IN BLOOD BY AUTOMATED COUNT: 13.7 % (ref 12.3–15.4)
GLUCOSE SERPL-MCNC: 108 MG/DL (ref 65–99)
GRAM STN SPEC: ABNORMAL
GRAM STN SPEC: ABNORMAL
HCT VFR BLD AUTO: 29.8 % (ref 37.5–51)
HGB BLD-MCNC: 9.6 G/DL (ref 13–17.7)
IMM GRANULOCYTES # BLD AUTO: 0.01 10*3/MM3 (ref 0–0.05)
IMM GRANULOCYTES NFR BLD AUTO: 0.2 % (ref 0–0.5)
ISOLATED FROM: ABNORMAL
ISOLATED FROM: ABNORMAL
LYMPHOCYTES # BLD AUTO: 1.23 10*3/MM3 (ref 0.7–3.1)
LYMPHOCYTES NFR BLD AUTO: 23.7 % (ref 19.6–45.3)
MCH RBC QN AUTO: 27.9 PG (ref 26.6–33)
MCHC RBC AUTO-ENTMCNC: 32.2 G/DL (ref 31.5–35.7)
MCV RBC AUTO: 86.6 FL (ref 79–97)
MONOCYTES # BLD AUTO: 0.45 10*3/MM3 (ref 0.1–0.9)
MONOCYTES NFR BLD AUTO: 8.7 % (ref 5–12)
NEUTROPHILS NFR BLD AUTO: 3.16 10*3/MM3 (ref 1.7–7)
NEUTROPHILS NFR BLD AUTO: 61 % (ref 42.7–76)
NRBC BLD AUTO-RTO: 0 /100 WBC (ref 0–0.2)
PLATELET # BLD AUTO: 199 10*3/MM3 (ref 140–450)
PMV BLD AUTO: 7.7 FL (ref 6–12)
POTASSIUM SERPL-SCNC: 4.2 MMOL/L (ref 3.5–5.2)
RBC # BLD AUTO: 3.44 10*6/MM3 (ref 4.14–5.8)
SODIUM SERPL-SCNC: 136 MMOL/L (ref 136–145)
WBC NRBC COR # BLD AUTO: 5.18 10*3/MM3 (ref 3.4–10.8)

## 2024-06-24 PROCEDURE — 25810000003 SODIUM CHLORIDE 0.9 % SOLUTION 250 ML FLEX CONT: Performed by: STUDENT IN AN ORGANIZED HEALTH CARE EDUCATION/TRAINING PROGRAM

## 2024-06-24 PROCEDURE — 99231 SBSQ HOSP IP/OBS SF/LOW 25: CPT | Performed by: NEUROLOGICAL SURGERY

## 2024-06-24 PROCEDURE — 25010000002 HYDROMORPHONE PER 4 MG: Performed by: HOSPITALIST

## 2024-06-24 PROCEDURE — 85025 COMPLETE CBC W/AUTO DIFF WBC: CPT | Performed by: NURSE PRACTITIONER

## 2024-06-24 PROCEDURE — 73630 X-RAY EXAM OF FOOT: CPT

## 2024-06-24 PROCEDURE — 73600 X-RAY EXAM OF ANKLE: CPT

## 2024-06-24 PROCEDURE — 25010000002 VANCOMYCIN 1 G RECONSTITUTED SOLUTION 1 EACH VIAL: Performed by: STUDENT IN AN ORGANIZED HEALTH CARE EDUCATION/TRAINING PROGRAM

## 2024-06-24 PROCEDURE — 99232 SBSQ HOSP IP/OBS MODERATE 35: CPT | Performed by: STUDENT IN AN ORGANIZED HEALTH CARE EDUCATION/TRAINING PROGRAM

## 2024-06-24 PROCEDURE — 80048 BASIC METABOLIC PNL TOTAL CA: CPT | Performed by: NURSE PRACTITIONER

## 2024-06-24 RX ADMIN — HYDROMORPHONE HYDROCHLORIDE 0.5 MG: 1 INJECTION, SOLUTION INTRAMUSCULAR; INTRAVENOUS; SUBCUTANEOUS at 03:47

## 2024-06-24 RX ADMIN — OXYCODONE AND ACETAMINOPHEN 1 TABLET: 7.5; 325 TABLET ORAL at 14:07

## 2024-06-24 RX ADMIN — HYDROMORPHONE HYDROCHLORIDE 0.5 MG: 1 INJECTION, SOLUTION INTRAMUSCULAR; INTRAVENOUS; SUBCUTANEOUS at 20:32

## 2024-06-24 RX ADMIN — HYDROMORPHONE HYDROCHLORIDE 0.5 MG: 1 INJECTION, SOLUTION INTRAMUSCULAR; INTRAVENOUS; SUBCUTANEOUS at 08:02

## 2024-06-24 RX ADMIN — OXYCODONE AND ACETAMINOPHEN 1 TABLET: 7.5; 325 TABLET ORAL at 05:03

## 2024-06-24 RX ADMIN — METOPROLOL SUCCINATE 50 MG: 50 TABLET, FILM COATED, EXTENDED RELEASE ORAL at 08:02

## 2024-06-24 RX ADMIN — OXYCODONE AND ACETAMINOPHEN 1 TABLET: 7.5; 325 TABLET ORAL at 23:05

## 2024-06-24 RX ADMIN — LIDOCAINE 1 PATCH: 4 PATCH TOPICAL at 08:03

## 2024-06-24 RX ADMIN — OXYCODONE AND ACETAMINOPHEN 1 TABLET: 7.5; 325 TABLET ORAL at 18:58

## 2024-06-24 RX ADMIN — VANCOMYCIN HYDROCHLORIDE 1000 MG: 1 INJECTION, POWDER, LYOPHILIZED, FOR SOLUTION INTRAVENOUS at 10:04

## 2024-06-24 RX ADMIN — GABAPENTIN 300 MG: 300 CAPSULE ORAL at 20:32

## 2024-06-24 RX ADMIN — SENNOSIDES AND DOCUSATE SODIUM 2 TABLET: 50; 8.6 TABLET ORAL at 08:03

## 2024-06-24 RX ADMIN — OXYCODONE AND ACETAMINOPHEN 1 TABLET: 7.5; 325 TABLET ORAL at 00:43

## 2024-06-24 RX ADMIN — HYDROMORPHONE HYDROCHLORIDE 0.5 MG: 1 INJECTION, SOLUTION INTRAMUSCULAR; INTRAVENOUS; SUBCUTANEOUS at 16:25

## 2024-06-24 RX ADMIN — VANCOMYCIN HYDROCHLORIDE 1000 MG: 1 INJECTION, POWDER, LYOPHILIZED, FOR SOLUTION INTRAVENOUS at 23:05

## 2024-06-24 RX ADMIN — HYDROMORPHONE HYDROCHLORIDE 0.5 MG: 1 INJECTION, SOLUTION INTRAMUSCULAR; INTRAVENOUS; SUBCUTANEOUS at 12:17

## 2024-06-24 RX ADMIN — OXYCODONE AND ACETAMINOPHEN 1 TABLET: 7.5; 325 TABLET ORAL at 10:04

## 2024-06-24 NOTE — DISCHARGE PLACEMENT REQUEST
"Onel Toro (74 y.o. Male)       Date of Birth   1950    Social Security Number       Address   [address] [place] [address]    Home Phone   [phone]    MRN   [id number]       Religious   Hinduism    Marital Status                               Admission Date   6/21/24    Admission Type   Emergency    Admitting Provider   Ronaldo Camarillo MD    Attending Provider   Yomi Goldsmith MD    Department, Room/Bed   [address], P877/1       Discharge Date       Discharge Disposition       Discharge Destination                                 Attending Provider: Yomi Goldsmith MD    Allergies: No Known Allergies    Isolation: Contact   Infection: MRSA (06/22/24)   Code Status: CPR    Ht: 182.8 cm (71.97\")   Wt: 81.2 kg (179 lb)    Admission Cmt: None   Principal Problem: Discitis of thoracic region [M46.44]                   Active Insurance as of 6/21/2024       Primary Coverage       Payor Plan Insurance Group Employer/Plan Group    HUMANA MEDICARE REPLACEMENT HU[employer] MED ADV GROUP [id number]       Payor Plan Address Payor Plan Phone Number Payor Plan Fax Number Effective Dates    [address] [address] [phone]  1/1/2018 - None Entered    [facility] [id number]         Subscriber Name Subscriber Birth Date Member ID       ONEL TORO DEVIN 1950 [id number]                     Emergency Contacts        (Rel.) Home Phone Work Phone Mobile Phone    Adrian Lavonne (Spouse) [phone] -- --                "

## 2024-06-24 NOTE — CONSULTS
Podiatry Consult Note      Patient: Onel Campbell Admit Date: 06/21/2024    Age: 74 y.o.   PCP: Asif Patricia MD    MRN: [id number]  Room: [facility]        Subjective     Chief Complaint     Chief Complaint   Patient presents with    Back Pain        HPI     This is a 74-year-old male with suspected chronic osteomyelitis of his left foot and ankle.  Patient is established with Dr. Seth at [facility].  Patient underwent a Charcot reconstructive surgery in February 2024.  Subsequent to the surgery, patient had postoperative infection and underwent several debridements.  All wounds to the left foot and ankle are now currently closed.    A week ago, patient started to experience back pain.  They were about to leave for [place] but decided to come to [facility] urgency department.  Patient was admitted and CT scan showed abscess with discitis.  He has been receiving IV antibiotics and is awaiting a MARCELLUS.  No other complaints at this time.    Past Medical History     Past Medical History:   Diagnosis Date    Diabetes     GERD (gastroesophageal reflux disease)     Hypertension     Prostate cancer     Renal disease     Sleep apnea         History reviewed. No pertinent surgical history.     No Known Allergies     Social History     Tobacco Use   Smoking Status Former    Types: Cigarettes   Smokeless Tobacco Not on file        Objective   Physical Exam    Vitals:    06/24/24 1408   BP: 121/78   Pulse: 86   Resp: 16   Temp: 96.8 °F (36 °C)   SpO2: 98%        Dermatology: Several incisions of the foot and ankle.  No open lesions noted.  Increased warmth to the left ankle compared to the right ankle.    Vascular: DP and PT pulses palpable    Neurological:Light touch and protective sensation are absent     musculoskeletal: 5 out of 5 muscle strength all compartments.    Labs     Lab Results   Component Value Date    POCGLU 116 06/23/2024    SEDRATE 52 (H) 06/21/2024        CBC:      Lab  06/24/24  0344 06/23/24  0608 06/22/24  0248 06/21/24  0800   WBC 5.18 5.44 4.99 6.34   HEMOGLOBIN 9.6* 9.6* 9.6* 11.1*   HEMATOCRIT 29.8* 29.7* 29.4* 33.9*   PLATELETS 199 223 188 245   NEUTROS ABS 3.16 3.53 3.44 4.74   IMMATURE GRANS (ABS) 0.01 0.02 0.01 0.03   LYMPHS ABS 1.23 1.17 0.87 0.79   MONOS ABS 0.45 0.44 0.36 0.51   EOS ABS 0.29 0.25 0.27 0.23   MCV 86.6 85.8 86.5 87.1          Results for orders placed or performed during the hospital encounter of 06/21/24   Blood Culture - Blood, Arm, Right    Specimen: Arm, Right; Blood   Result Value Ref Range    Blood Culture No growth at 24 hours         MRI Thoracic Spine With & Without Contrast  Narrative: THORACIC SPINE MRI WITHOUT AND WITH GADOLINIUM     HISTORY: rule out osteomylitis; M46.44-Discitis, unspecified, thoracic  region     COMPARISON: June 21, 2024.     FINDINGS:  Multiplanar images of the thoracic spine obtained without and  with gadolinium.  Axial images obtained from T1-S1.     There is abnormal T1 hypointense/T2 hyperintense signal identified  within the T3 and T4 vertebral bodies, along with some fluid within the  intervertebral disc space, concerning for osteomyelitis/discitis.  Following contrast administration, there is avid enhancement within the  vertebral bodies, and paraspinous tissues on the right. No additional  areas of osteomyelitis/discitis are seen. No acute fracture or  subluxation is seen. Cord signal appears normal. I do think there is an  epidural abscess/phlegmon anteriorly on the right at T3-T4, which  measures up to 1.9 cm in craniocaudal dimensions, and up to 3 mm in AP  dimensions. This is resulting in some moderate canal narrowing on the  right, and some narrowing of the right neural foramen. Patient also  appears to have some disc bulge at T7-T8, which results in effacement of  the thecal sac anteriorly. There is also some mild neuroforaminal  narrowing on the left. Further disc bulges noted at T10-T11, again, with  some  effacement of the anterior aspect of the thecal sac.     Impression: 1. Osteomyelitis/discitis noted at T3-T4. There is extensive  inflammation within the paraspinous soft tissues, favored to represent  phlegmon. The patient also appears to have a small abscess within the  right anterior epidural space at T3-T4, which measures approximately 3  mm in AP dimensions and centimeters in craniocaudal dimensions. This is  resulting in moderate canal narrowing on the right at this level. There  is also some involvement of the right neural foramen.     This report was finalized on 6/23/2024 4:16 AM by Dr. Louisa Carter M.D on Workstation: [id number]          Assessment/Plan     74-year-old male with possible infected hardware, chronic osteomyelitis, Charcot neuroarthropathy left foot and ankle    -Patient examined and evaluated by myself  - IV antibiotics per infectious disease  - I will order both x-ray and CT scan of the left lower extremity.  I spoke with Dr. Seth regarding this patient's care.  We both agree that if the spinal infection is caused by the foot and ankle hardware infection, the hardware would need to be removed sooner rather than later.  I discussed this with both the patient and his wife.  We will wait for the advanced imaging to come back, however we will likely plan to take out hardware during this admission and send cultures for appropriate antibiotic management.  -Thank you for the consult, I will continue to follow      Ranjith Swift DPM  Office: [phone]

## 2024-06-24 NOTE — PROGRESS NOTES
"[facility] Clinical Pharmacy Services: Vancomycin Monitoring Note    Onel Campbell is a 74 y.o. male who is on day 4 of pharmacy to dose vancomycin for MRSA septicemia and L foot osteomyelitis in the setting of hardware infection.    Previous Vancomycin Dose: 1000 mg IV every 12 hours    Updated Cultures and Sensitivities:   6/21: BCx-MRSA (2 of 2 sets)  6/23: BCx-NG at 24 hrs    Results from last 7 days   Lab Units 06/23/24  1102   VANCOMYCIN TR mcg/mL 10.20     Vitals/Labs  Ht: 182.8 cm (71.97\"); Wt: 81.2 kg (179 lb)   Temp Readings from Last 1 Encounters:   06/24/24 97 °F (36.1 °C) (Oral)     Estimated Creatinine Clearance: 82.4 mL/min (by C-G formula based on SCr of 0.89 mg/dL).     Results from last 7 days   Lab Units 06/24/24  0344 06/23/24  0608 06/22/24  0248   CREATININE mg/dL 0.89 0.86 0.87   WBC 10*3/mm3 5.18 5.44 4.99     Assessment/Plan    Current Vancomycin Dose: 1000 mg IV every 12 hours; provides a predicted  mg/L.hr   Next Level Date and Time: Vanc Trough is scheduled to be drawn tomorrow morning 6/25 at 1000.  We will continue to monitor patient changes and renal function until vancomycin is discontinued or patient is discharged-SCr is stable today at 0.89. BMP has been ordered daily to trend renal function while the patient is on vancomycin.     Thank you for involving pharmacy in this patient's care. Please contact pharmacy with any questions or concerns.       Aileen Ellington, Pharm.D., [facility]   Clinical Pharmacist  "

## 2024-06-24 NOTE — PROGRESS NOTES
LOS: 3 days   Patient Care Team:  Asif Patricia MD as PCP - General (Internal Medicine)    Chief Complaint: Upper thoracic pain    Subjective     Patient continues with pain in his upper thoracic spine.  He has no other complaints.    Interval History:     History taken from: patient chart family    Objective      He has good movement in all 4 extremities    Vital Signs  Temp:  [97 °F (36.1 °C)-98.9 °F (37.2 °C)] 97 °F (36.1 °C)  Heart Rate:  [69-83] 71  Resp:  [16] 16  BP: ()/(59-71) 133/71       Results Review:     I reviewed the patient's new clinical results.  I reviewed his MRI again with the radiologist.  There is a very small epidural abscess on the right causing a little distortion of the cord but no cord compression.  There is also significant phlegmon anterior to the spine.      Assessment & Plan       Discitis of thoracic region    Essential hypertension    Peripheral vascular disease    Gastroesophageal reflux disease    History of MRSA infection    Obstructive sleep apnea of adult    Type 2 diabetes mellitus with other specified complication, without long-term current use of insulin    Opioid dependence    Subacute osteomyelitis of left ankle    Discitis      I would agree with long-term vancomycin.  From the office in 2 or 3 weeks after discharge.  I have to follow him clinically.  I explained that MRIs in this situation generally become worse before they can become better.  Therefore I would hold off on further imaging unless his symptoms change.      Samuel Jasso MD  06/24/24  08:59 EDT

## 2024-06-24 NOTE — PROGRESS NOTES
Name: Onel Campbell ADMIT: 2024   : 1950  PCP: Asif Patricia MD    MRN: [id number] LOS: 3 days   AGE/SEX: 74 y.o. male  ROOM: [facility]     Subjective   Subjective   Patient resting comfortably bed.  Continues to have intermittent back pain but that is manageable.  Discussed plan for TTE.    Review of Systems   Constitutional:  Negative for chills and fever.   Respiratory:  Negative for cough and shortness of breath.    Cardiovascular:  Negative for chest pain and palpitations.   Gastrointestinal:  Negative for abdominal pain, diarrhea, nausea and vomiting.   Musculoskeletal:  Positive for back pain.     As above     Objective   Objective   Vital Signs  Temp:  [97 °F (36.1 °C)-98.9 °F (37.2 °C)] 97.7 °F (36.5 °C)  Heart Rate:  [71-88] 88  Resp:  [16] 16  BP: ()/(59-77) 126/77  SpO2:  [96 %-100 %] 96 %  on   ;   Device (Oxygen Therapy): room air  Body mass index is 24.3 kg/m².  Physical Exam  Vitals and nursing note reviewed.   Constitutional:       General: He is not in acute distress.  Cardiovascular:      Rate and Rhythm: Normal rate and regular rhythm.   Pulmonary:      Effort: Pulmonary effort is normal. No respiratory distress.   Abdominal:      General: Abdomen is flat. There is no distension.      Tenderness: There is no abdominal tenderness.   Musculoskeletal:         General: No swelling or deformity.   Skin:     General: Skin is warm and dry.   Neurological:      General: No focal deficit present.      Mental Status: He is alert. Mental status is at baseline.      Sensory: No sensory deficit.      Motor: No weakness.         Results Review     I reviewed the patient's new clinical results.  Results from last 7 days   Lab Units 24  0344 24  0608 24  0248 24  0800   WBC 10*3/mm3 5.18 5.44 4.99 6.34   HEMOGLOBIN g/dL 9.6* 9.6* 9.6* 11.1*   PLATELETS 10*3/mm3 199 223 188 245     Results from last 7 days   Lab Units 24  0344 24  0608  "06/22/24  0248 06/21/24  0800   SODIUM mmol/L 136 139 138 138   POTASSIUM mmol/L 4.2 4.3 4.2 4.2   CHLORIDE mmol/L 102 104 103 100   CO2 mmol/L 27.0 28.0 28.0 30.0*   BUN mg/dL 13 13 12 15   CREATININE mg/dL 0.89 0.86 0.87 0.91   GLUCOSE mg/dL 108* 114* 94 114*   Estimated Creatinine Clearance: 82.4 mL/min (by C-G formula based on SCr of 0.89 mg/dL).  Results from last 7 days   Lab Units 06/21/24  0800   ALBUMIN g/dL 3.6   BILIRUBIN mg/dL 0.8   ALK PHOS U/L 226*   AST (SGOT) U/L 18   ALT (SGPT) U/L 11     Results from last 7 days   Lab Units 06/24/24  0344 06/23/24  0608 06/22/24  0248 06/21/24  0800   CALCIUM mg/dL 9.0 9.1 8.9 9.5   ALBUMIN g/dL  --   --   --  3.6     Results from last 7 days   Lab Units 06/21/24  1432 06/21/24  1107   LACTATE mmol/L 1.0 2.2*   No results found for: \"COVID19\"  Glucose   Date/Time Value Ref Range Status   06/23/2024 0709 116 70 - 130 mg/dL Final   06/22/2024 2029 147 (H) 70 - 130 mg/dL Final   06/22/2024 1725 118 70 - 130 mg/dL Final   06/22/2024 1125 126 70 - 130 mg/dL Final   06/22/2024 0730 111 70 - 130 mg/dL Final   06/21/2024 2059 107 70 - 130 mg/dL Final   06/21/2024 1827 91 70 - 130 mg/dL Final       MRI Thoracic Spine With & Without Contrast  Narrative: THORACIC SPINE MRI WITHOUT AND WITH GADOLINIUM     HISTORY: rule out osteomylitis; M46.44-Discitis, unspecified, thoracic  region     COMPARISON: June 21, 2024.     FINDINGS:  Multiplanar images of the thoracic spine obtained without and  with gadolinium.  Axial images obtained from T1-S1.     There is abnormal T1 hypointense/T2 hyperintense signal identified  within the T3 and T4 vertebral bodies, along with some fluid within the  intervertebral disc space, concerning for osteomyelitis/discitis.  Following contrast administration, there is avid enhancement within the  vertebral bodies, and paraspinous tissues on the right. No additional  areas of osteomyelitis/discitis are seen. No acute fracture or  subluxation is seen. " Cord signal appears normal. I do think there is an  epidural abscess/phlegmon anteriorly on the right at T3-T4, which  measures up to 1.9 cm in craniocaudal dimensions, and up to 3 mm in AP  dimensions. This is resulting in some moderate canal narrowing on the  right, and some narrowing of the right neural foramen. Patient also  appears to have some disc bulge at T7-T8, which results in effacement of  the thecal sac anteriorly. There is also some mild neuroforaminal  narrowing on the left. Further disc bulges noted at T10-T11, again, with  some effacement of the anterior aspect of the thecal sac.     Impression: 1. Osteomyelitis/discitis noted at T3-T4. There is extensive  inflammation within the paraspinous soft tissues, favored to represent  phlegmon. The patient also appears to have a small abscess within the  right anterior epidural space at T3-T4, which measures approximately 3  mm in AP dimensions and centimeters in craniocaudal dimensions. This is  resulting in moderate canal narrowing on the right at this level. There  is also some involvement of the right neural foramen.     This report was finalized on 6/23/2024 4:16 AM by Dr. Louisa Carter M.D on Workstation: [id number]       I reviewed the patient's daily medications.  Scheduled Medications  gabapentin, 300 mg, Oral, Nightly  Lidocaine, 1 patch, Transdermal, Q24H  metoprolol succinate XL, 50 mg, Oral, Daily  senna-docusate sodium, 2 tablet, Oral, BID  vancomycin, 1,000 mg, Intravenous, Q12H    Infusions  Pharmacy to dose vancomycin,     Diet  Diet: Cardiac, Diabetic; Healthy Heart (2-3 Na+); Consistent Carbohydrate; Fluid Consistency: Thin (IDDSI 0)         I have personally reviewed:  [x]  Laboratory   [x]  Microbiology   [x]  Radiology   []  EKG/Telemetry   []  Cardiology/Vascular   []  Pathology   []  Records     Assessment/Plan     Active Hospital Problems    Diagnosis  POA    **Discitis of thoracic region [M46.44]  Yes    Opioid dependence  [F11.20]  Yes    Subacute osteomyelitis of left ankle [M86.272]  Yes    Discitis [M46.40]  Yes    Obstructive sleep apnea of adult [G47.33]  Yes    Type 2 diabetes mellitus with other specified complication, without long-term current use of insulin [E11.69]  Yes    History of MRSA infection [Z86.14]  Yes    Peripheral vascular disease [I73.9]  Yes    Essential hypertension [I10]  Yes    Gastroesophageal reflux disease [K21.9]  Yes      Resolved Hospital Problems   No resolved problems to display.       74 y.o. male admitted with Discitis of thoracic region.    MSRA bacteremia  Left foot osteomyelitis with hardware infection on suppressive Bactrim  T3-T4 discitis with small abscess  -Discussed with infectious disease, continue vancomycin.  Plan for MARCELLUS.  Cardiology has been consulted  -Neurosurgery consulted, no surgery needed.  Plan to follow-up as an outpatient.  -Continue pain regimen.  Bowel regimen scheduled  -Repeat blood cultures no growth for 24 hours  -Long discussion in the room today with patient, wife, Dr. Ramirez on the risk and benefits of PICC line and IV antibiotics with his history of substance abuse.  He has been sober for 10 years and he only ever used pills he has never used IV drugs.  He has already failed outpatient oral antibiotics.  -Plan for PICC line tomorrow blood cultures were no growth for 40 hours and 6 weeks of vancomycin    Hypertension  -Coreg previously discontinued and then started on metoprolol's for rebound tachycardia as outpatient office    Diabetes type 2 with neuropathy  -On Mounjaro and Jardiance as an outpatient  -Accu-Cheks have been all at goal since admission, discontinue Accu-Chek and sliding scale insulin  -Continue gabapentin    Substance use disorder    SCDs for DVT prophylaxis.  Full code.  Discussed with patient, family, and nursing staff.  Anticipate discharge home with family in 1-2 days.    Expected Discharge Date: 6/25/2024; Expected Discharge Time:       Yomi BURTON  MD Agus  [facility]  06/24/24  12:02 EDT

## 2024-06-24 NOTE — PLAN OF CARE
Goal Outcome Evaluation:  Plan of Care Reviewed With: patient        Progress: no change  Outcome Evaluation: Pt remains stable. Continues to receive Vanc for abx therapy. Up with SBA, cam boot utilized to L foot. Wound to see regarding wound on R foot/toes. Dilaudid and percocet given for pain. Voiding without difficulty. No BM since 6/20, offered to increase his bowel regimen but he only wanted stool softeners. Cardiology recommending Echo but not ordered yet. Will dc home when appropriate with ABXs.

## 2024-06-24 NOTE — PROGRESS NOTES
LOS: 3 days     Chief Complaint: MRSA septicemia    Interval History: Patient reports he is doing well this morning.  Continues to have back pain.  Denies any fevers or chills.    Vital Signs  Temp:  [97 °F (36.1 °C)-98.9 °F (37.2 °C)] 97 °F (36.1 °C)  Heart Rate:  [69-83] 71  Resp:  [16] 16  BP: ()/(59-71) 133/71    Physical Exam:  General: In no acute distress  HEENT: Oropharynx clear, moist mucous membranes  Respiratory: Normal work of breathing  Skin: No rashes or lesions in exposed areas  Extremities: No edema, cyanosis  Access: Peripheral IV    Antibiotics:  Anti-Infectives (From admission, onward)      Ordered     Dose/Rate Route Frequency Start Stop    06/23/24 1302  vancomycin (VANCOCIN) 1,000 mg in sodium chloride 0.9 % 250 mL IVPB-VTB        Ordering Provider: Tino Ramirez DO    1,000 mg  250 mL/hr over 60 Minutes Intravenous Every 12 Hours 06/23/24 2300 07/05/24 2259    06/21/24 1418  Pharmacy to dose vancomycin        Ordering Provider: Ronaldo Camarillo MD     Does not apply Continuous PRN 06/21/24 1418 07/05/24 1417    06/21/24 1035  piperacillin-tazobactam (ZOSYN) 3.375 g IVPB in 100 mL NS MBP (CD)        Ordering Provider: Samuel Dietrich MD    3.375 g  over 30 Minutes Intravenous Once 06/21/24 1051 06/21/24 1208    06/21/24 1035  vancomycin IVPB 1500 mg in 0.9% NaCl (Premix) 500 mL        Ordering Provider: Samuel Dietrich MD    20 mg/kg × 81.2 kg  333.3 mL/hr over 90 Minutes Intravenous Once 06/21/24 1051 06/21/24 1341             Results Review:     I reviewed the patient's new clinical results.    Lab Results   Component Value Date    WBC 5.18 06/24/2024    HGB 9.6 (L) 06/24/2024    HCT 29.8 (L) 06/24/2024    MCV 86.6 06/24/2024     06/24/2024     Lab Results   Component Value Date    GLUCOSE 108 (H) 06/24/2024    BUN 13 06/24/2024    CREATININE 0.89 06/24/2024    EGFRIFNONA 51 (L) 03/27/2024    EGFRIFAFRI 62 03/27/2024    BCR 14.6 06/24/2024    CO2 27.0 06/24/2024     CALCIUM 9.0 06/24/2024    ALBUMIN 3.6 06/21/2024    AST 18 06/21/2024    ALT 11 06/21/2024       Microbiology:  6/21 blood cultures 2/2 MRSA  6/23 blood cultures pending       Assessment    1.  MRSA bacteremia  2.  Left foot osteomyelitis in the setting of hardware infection on suppressive Bactrim  3.  Diabetes mellitus type 2  4.  Thoracic discitis likely secondary to MRSA bacteremia    Recurrent MRSA bacteremia likely secondary to his known hardware infection of the left foot with MRSA.  I suspect he may need evaluation long-term for removal prior to completion of this round of therapy.    Repeat blood cultures are negative at 24 hours.  TTE is pending.  Will follow these up however will likely need 6-week course of vancomycin.  Continue goal -600 dosed by pharmacy.    Long discussion today about his history of opioid abuse with pills and denies ever history of IV drug use.  He was counseled extensively on the risks associated with PICC line and wife was at bedside for this counseling.  She assures she will monitor his PICC line and patient is agreeable.  He also reports that he has no intention of using it for illicit substances.    ID will follow.

## 2024-06-24 NOTE — PROGRESS NOTES
Chart check.  Vital signs appear stable.  Echocardiogram not yet completed.  Please call cardiology with any questions or concerns.      ANDREW Sequeira  [facility]   [address]  [place] [address]  Ph: [phone]  Fax: [phone]

## 2024-06-24 NOTE — PROGRESS NOTES
Continued Stay Note  [facility]     Patient Name: Onel Campbell  MRN: [id number]  Today's Date: 6/24/2024    Admit Date: 6/21/2024    Plan: Home with IV abx . Referrasl in EPIC for [facility]/PENDING   Discharge Plan       Row Name 06/24/24 1445       Plan    Plan Home with IV abx . Referrasl in EPIC for [facility]/PENDING    Plan Comments Spoke to spouse Lavonne via phone regarding DC plan. Plan is to go home with IV ABX. Spouse stated she is not sure if PICC will be today. Stated patient has used [facility] in the past and requested referral to them again. Stated they had Kelly RN, Uzma PT and Heydi OT. Requested to have them all again. Referral placed in EPIC.                   Discharge Codes    No documentation.                 Expected Discharge Date and Time       Expected Discharge Date Expected Discharge Time    Jun 25, 2024               Colleen Snyder, RN

## 2024-06-24 NOTE — PLAN OF CARE
Goal Outcome Evaluation:   Patient is Aox4. VSS on RA. Ambulating assist x1. PO/IV PRN medication required for pain management. Contact precautions for MRSA continued. Next Vanc trough Tuesday (6/25) at 1000. Podiatry consulted, CT of LLE and Xray of left ankle/foot ordered. All needs currently met, will continue to monitor.

## 2024-06-24 NOTE — NURSING NOTE
06/24/24 1520   Wound 06/24/24 Right posterior foot Diabetic Ulcer   Placement Date: 06/24/24   Present on Original Admission: Yes  Side: Right  Orientation: (c) posterior  Location: (c) foot  Primary Wound Type: Diabetic Ulcer   Dressing Appearance intact  (optifoam)   Base moist;pink   Periwound   (dried, peeling skin)   Wound Length (cm) 1 cm   Wound Width (cm) 1 cm   Wound Depth (cm) 0.1 cm   Wound Surface Area (cm^2) 1 cm^2   Wound Volume (cm^3) 0.1 cm^3   Drainage Amount none   Dressing Care dressing changed  (applied betadine moist gauze, cover with 4x4 white bordered gauze)   Wound 06/24/24 Right medial fifth toe Diabetic Ulcer   Placement Date: 06/24/24   Present on Original Admission: Yes  Side: Right  Orientation: medial  Location: fifth toe  Primary Wound Type: Diabetic Ulcer   Dressing Appearance open to air   Base pink;moist;yellow   Wound Length (cm) 0.5 cm   Wound Width (cm) 0.5 cm   Wound Depth (cm) 0.1 cm   Wound Surface Area (cm^2) 0.25 cm^2   Wound Volume (cm^3) 0.025 cm^3   Drainage Amount   (moist)   Care, Wound   (placed betadine moist folded 4x4 gauze between toes with betadine making contact with wounds)   Wound 06/24/24 Right lateral fourth toe Diabetic Ulcer   Placement Date: 06/24/24   Present on Original Admission: Yes  Side: Right  Orientation: lateral  Location: fourth toe  Primary Wound Type: Diabetic Ulcer   Dressing Appearance open to air   Base pink;moist;yellow   Wound Length (cm) 0.5 cm   Wound Width (cm) 0.5 cm   Wound Depth (cm) 0.1 cm   Wound Surface Area (cm^2) 0.25 cm^2   Wound Volume (cm^3) 0.025 cm^3     Wound/ostomy - consult received regarding wounds to R foot POA. Patient has charcot deformity, is followed by  wound/podiatry clinic. Spouse is present and states that wounds to R plantar foot and between 5th and 4th toes have been present for a while, they treat with betadine and gauze. Dressings applied and wound care orders added, change daily.

## 2024-06-25 ENCOUNTER — APPOINTMENT (OUTPATIENT)
Dept: CARDIOLOGY | Facility: HOSPITAL | Age: 74
End: 2024-06-25
Payer: MEDICARE

## 2024-06-25 ENCOUNTER — APPOINTMENT (OUTPATIENT)
Dept: CT IMAGING | Facility: HOSPITAL | Age: 74
End: 2024-06-25
Payer: MEDICARE

## 2024-06-25 LAB
ANION GAP SERPL CALCULATED.3IONS-SCNC: 7 MMOL/L (ref 5–15)
BASOPHILS # BLD AUTO: 0.06 10*3/MM3 (ref 0–0.2)
BASOPHILS NFR BLD AUTO: 1 % (ref 0–1.5)
BH CV ECHO MEAS - ACS: 2.4 CM
BH CV ECHO MEAS - AO ROOT DIAM: 3.9 CM
BH CV ECHO MEAS - EDV(CUBED): 149.3 ML
BH CV ECHO MEAS - EDV(MOD-SP2): 101 ML
BH CV ECHO MEAS - EDV(MOD-SP4): 135 ML
BH CV ECHO MEAS - EF(MOD-BP): 61.1 %
BH CV ECHO MEAS - EF(MOD-SP2): 59.4 %
BH CV ECHO MEAS - EF(MOD-SP4): 60.7 %
BH CV ECHO MEAS - ESV(CUBED): 68.8 ML
BH CV ECHO MEAS - ESV(MOD-SP2): 41 ML
BH CV ECHO MEAS - ESV(MOD-SP4): 53 ML
BH CV ECHO MEAS - FS: 22.8 %
BH CV ECHO MEAS - IVS/LVPW: 1.03 CM
BH CV ECHO MEAS - IVSD: 0.97 CM
BH CV ECHO MEAS - LV DIASTOLIC VOL/BSA (35-75): 67.1 CM2
BH CV ECHO MEAS - LV MASS(C)D: 189.4 GRAMS
BH CV ECHO MEAS - LV SYSTOLIC VOL/BSA (12-30): 26.3 CM2
BH CV ECHO MEAS - LVIDD: 5.3 CM
BH CV ECHO MEAS - LVIDS: 4.1 CM
BH CV ECHO MEAS - LVOT AREA: 3.3 CM2
BH CV ECHO MEAS - LVOT DIAM: 2.05 CM
BH CV ECHO MEAS - LVPWD: 0.94 CM
BH CV ECHO MEAS - MV A DUR: 0.1 SEC
BH CV ECHO MEAS - MV A MAX VEL: 80.4 CM/SEC
BH CV ECHO MEAS - MV DEC SLOPE: 221.6 CM/SEC2
BH CV ECHO MEAS - MV DEC TIME: 0.25 SEC
BH CV ECHO MEAS - MV E MAX VEL: 68.9 CM/SEC
BH CV ECHO MEAS - MV E/A: 0.86
BH CV ECHO MEAS - MV MAX PG: 2.7 MMHG
BH CV ECHO MEAS - MV MEAN PG: 1 MMHG
BH CV ECHO MEAS - MV P1/2T: 92.4 MSEC
BH CV ECHO MEAS - MV V2 VTI: 25.1 CM
BH CV ECHO MEAS - MVA(P1/2T): 2.38 CM2
BH CV ECHO MEAS - PA ACC TIME: 0.17 SEC
BH CV ECHO MEAS - PA V2 MAX: 74.7 CM/SEC
BH CV ECHO MEAS - RAP SYSTOLE: 3 MMHG
BH CV ECHO MEAS - RV MAX PG: 2.9 MMHG
BH CV ECHO MEAS - RV V1 MAX: 85.3 CM/SEC
BH CV ECHO MEAS - RV V1 VTI: 14.7 CM
BH CV ECHO MEAS - SV(MOD-SP2): 60 ML
BH CV ECHO MEAS - SV(MOD-SP4): 82 ML
BH CV ECHO MEAS - SVI(MOD-SP2): 29.8 ML/M2
BH CV ECHO MEAS - SVI(MOD-SP4): 40.8 ML/M2
BUN SERPL-MCNC: 15 MG/DL (ref 8–23)
BUN/CREAT SERPL: 17 (ref 7–25)
CALCIUM SPEC-SCNC: 9.2 MG/DL (ref 8.6–10.5)
CHLORIDE SERPL-SCNC: 104 MMOL/L (ref 98–107)
CO2 SERPL-SCNC: 26 MMOL/L (ref 22–29)
CREAT SERPL-MCNC: 0.88 MG/DL (ref 0.76–1.27)
DEPRECATED RDW RBC AUTO: 42.4 FL (ref 37–54)
EGFRCR SERPLBLD CKD-EPI 2021: 89.7 ML/MIN/1.73
EOSINOPHIL # BLD AUTO: 0.36 10*3/MM3 (ref 0–0.4)
EOSINOPHIL NFR BLD AUTO: 6 % (ref 0.3–6.2)
ERYTHROCYTE [DISTWIDTH] IN BLOOD BY AUTOMATED COUNT: 13.7 % (ref 12.3–15.4)
GLUCOSE SERPL-MCNC: 121 MG/DL (ref 65–99)
HCT VFR BLD AUTO: 29.7 % (ref 37.5–51)
HGB BLD-MCNC: 9.8 G/DL (ref 13–17.7)
IMM GRANULOCYTES # BLD AUTO: 0.05 10*3/MM3 (ref 0–0.05)
IMM GRANULOCYTES NFR BLD AUTO: 0.8 % (ref 0–0.5)
LYMPHOCYTES # BLD AUTO: 1.55 10*3/MM3 (ref 0.7–3.1)
LYMPHOCYTES NFR BLD AUTO: 25.8 % (ref 19.6–45.3)
MCH RBC QN AUTO: 28.5 PG (ref 26.6–33)
MCHC RBC AUTO-ENTMCNC: 33 G/DL (ref 31.5–35.7)
MCV RBC AUTO: 86.3 FL (ref 79–97)
MONOCYTES # BLD AUTO: 0.43 10*3/MM3 (ref 0.1–0.9)
MONOCYTES NFR BLD AUTO: 7.2 % (ref 5–12)
NEUTROPHILS NFR BLD AUTO: 3.56 10*3/MM3 (ref 1.7–7)
NEUTROPHILS NFR BLD AUTO: 59.2 % (ref 42.7–76)
NRBC BLD AUTO-RTO: 0 /100 WBC (ref 0–0.2)
PLATELET # BLD AUTO: 293 10*3/MM3 (ref 140–450)
PMV BLD AUTO: 7.9 FL (ref 6–12)
POTASSIUM SERPL-SCNC: 4.5 MMOL/L (ref 3.5–5.2)
RBC # BLD AUTO: 3.44 10*6/MM3 (ref 4.14–5.8)
SINUS: 3.1 CM
SODIUM SERPL-SCNC: 137 MMOL/L (ref 136–145)
STJ: 2.6 CM
VANCOMYCIN TROUGH SERPL-MCNC: 16.6 MCG/ML (ref 5–20)
WBC NRBC COR # BLD AUTO: 6.01 10*3/MM3 (ref 3.4–10.8)

## 2024-06-25 PROCEDURE — 85025 COMPLETE CBC W/AUTO DIFF WBC: CPT | Performed by: NURSE PRACTITIONER

## 2024-06-25 PROCEDURE — 25510000001 PERFLUTREN (DEFINITY) 8.476 MG IN SODIUM CHLORIDE (PF) 0.9 % 10 ML INJECTION: Performed by: STUDENT IN AN ORGANIZED HEALTH CARE EDUCATION/TRAINING PROGRAM

## 2024-06-25 PROCEDURE — 25010000002 VANCOMYCIN 1 G RECONSTITUTED SOLUTION 1 EACH VIAL: Performed by: STUDENT IN AN ORGANIZED HEALTH CARE EDUCATION/TRAINING PROGRAM

## 2024-06-25 PROCEDURE — 80202 ASSAY OF VANCOMYCIN: CPT | Performed by: HOSPITALIST

## 2024-06-25 PROCEDURE — 73701 CT LOWER EXTREMITY W/DYE: CPT

## 2024-06-25 PROCEDURE — 25010000002 HYDROMORPHONE PER 4 MG: Performed by: HOSPITALIST

## 2024-06-25 PROCEDURE — 99232 SBSQ HOSP IP/OBS MODERATE 35: CPT | Performed by: STUDENT IN AN ORGANIZED HEALTH CARE EDUCATION/TRAINING PROGRAM

## 2024-06-25 PROCEDURE — 25810000003 SODIUM CHLORIDE 0.9 % SOLUTION 250 ML FLEX CONT: Performed by: STUDENT IN AN ORGANIZED HEALTH CARE EDUCATION/TRAINING PROGRAM

## 2024-06-25 PROCEDURE — 80048 BASIC METABOLIC PNL TOTAL CA: CPT | Performed by: HOSPITALIST

## 2024-06-25 PROCEDURE — 25510000001 IOPAMIDOL 61 % SOLUTION: Performed by: STUDENT IN AN ORGANIZED HEALTH CARE EDUCATION/TRAINING PROGRAM

## 2024-06-25 PROCEDURE — 93306 TTE W/DOPPLER COMPLETE: CPT | Performed by: INTERNAL MEDICINE

## 2024-06-25 PROCEDURE — 93306 TTE W/DOPPLER COMPLETE: CPT

## 2024-06-25 RX ORDER — SODIUM CHLORIDE 0.9 % (FLUSH) 0.9 %
10 SYRINGE (ML) INJECTION AS NEEDED
OUTPATIENT
Start: 2024-06-25

## 2024-06-25 RX ORDER — SODIUM CHLORIDE 0.9 % (FLUSH) 0.9 %
10 SYRINGE (ML) INJECTION EVERY 12 HOURS SCHEDULED
OUTPATIENT
Start: 2024-06-25

## 2024-06-25 RX ORDER — SODIUM CHLORIDE 0.9 % (FLUSH) 0.9 %
20 SYRINGE (ML) INJECTION AS NEEDED
OUTPATIENT
Start: 2024-06-25

## 2024-06-25 RX ADMIN — OXYCODONE AND ACETAMINOPHEN 1 TABLET: 7.5; 325 TABLET ORAL at 07:14

## 2024-06-25 RX ADMIN — HYDROMORPHONE HYDROCHLORIDE 0.5 MG: 1 INJECTION, SOLUTION INTRAMUSCULAR; INTRAVENOUS; SUBCUTANEOUS at 16:54

## 2024-06-25 RX ADMIN — METOPROLOL SUCCINATE 50 MG: 50 TABLET, FILM COATED, EXTENDED RELEASE ORAL at 08:03

## 2024-06-25 RX ADMIN — HYDROMORPHONE HYDROCHLORIDE 0.5 MG: 1 INJECTION, SOLUTION INTRAMUSCULAR; INTRAVENOUS; SUBCUTANEOUS at 20:58

## 2024-06-25 RX ADMIN — OXYCODONE AND ACETAMINOPHEN 1 TABLET: 7.5; 325 TABLET ORAL at 11:43

## 2024-06-25 RX ADMIN — IOPAMIDOL 85 ML: 612 INJECTION, SOLUTION INTRAVENOUS at 09:29

## 2024-06-25 RX ADMIN — HYDROMORPHONE HYDROCHLORIDE 0.5 MG: 1 INJECTION, SOLUTION INTRAMUSCULAR; INTRAVENOUS; SUBCUTANEOUS at 00:25

## 2024-06-25 RX ADMIN — OXYCODONE AND ACETAMINOPHEN 1 TABLET: 7.5; 325 TABLET ORAL at 16:00

## 2024-06-25 RX ADMIN — PERFLUTREN 3 ML: 6.52 INJECTION, SUSPENSION INTRAVENOUS at 14:13

## 2024-06-25 RX ADMIN — HYDROMORPHONE HYDROCHLORIDE 0.5 MG: 1 INJECTION, SOLUTION INTRAMUSCULAR; INTRAVENOUS; SUBCUTANEOUS at 12:26

## 2024-06-25 RX ADMIN — HYDROMORPHONE HYDROCHLORIDE 0.5 MG: 1 INJECTION, SOLUTION INTRAMUSCULAR; INTRAVENOUS; SUBCUTANEOUS at 04:24

## 2024-06-25 RX ADMIN — GABAPENTIN 300 MG: 300 CAPSULE ORAL at 22:00

## 2024-06-25 RX ADMIN — OXYCODONE AND ACETAMINOPHEN 1 TABLET: 7.5; 325 TABLET ORAL at 03:37

## 2024-06-25 RX ADMIN — OXYCODONE AND ACETAMINOPHEN 1 TABLET: 7.5; 325 TABLET ORAL at 23:55

## 2024-06-25 RX ADMIN — VANCOMYCIN HYDROCHLORIDE 1000 MG: 1 INJECTION, POWDER, LYOPHILIZED, FOR SOLUTION INTRAVENOUS at 22:01

## 2024-06-25 RX ADMIN — VANCOMYCIN HYDROCHLORIDE 1000 MG: 1 INJECTION, POWDER, LYOPHILIZED, FOR SOLUTION INTRAVENOUS at 12:12

## 2024-06-25 RX ADMIN — OXYCODONE AND ACETAMINOPHEN 1 TABLET: 7.5; 325 TABLET ORAL at 20:03

## 2024-06-25 RX ADMIN — HYDROMORPHONE HYDROCHLORIDE 0.5 MG: 1 INJECTION, SOLUTION INTRAMUSCULAR; INTRAVENOUS; SUBCUTANEOUS at 08:11

## 2024-06-25 NOTE — PROGRESS NOTES
Transthoracic echocardiogram completed .  There is no concern for endocarditis given this study.  Cardiology will sign off.  Patient will have 6-week course of vancomycin per infectious disease recommendation.  Please call with any additional questions or concerns.      ANDREW Sequeira  [facility]   [address] [place] [address] [address]  [place] [address]  Ph: [phone]  Fax: [phone]

## 2024-06-25 NOTE — PROGRESS NOTES
"[facility]: Vancomycin Monitoring Note    Onel Campbell is a 74 y.o. male who is on day 5/42 of pharmacy to dose vancomycin for MRSA septicemia and L foot osteomyelitis in the setting of hardware infection.    Previous Vancomycin Dose: 1000 mg IV every 12 hours    Updated Cultures and Sensitivities:  6/21: BCx-MRSA (2 of 2 sets)  6/23: BCx-NG at 48 hrs    Results from last 7 days   Lab Units 06/25/24  1000 06/23/24  1102   VANCOMYCIN TR mcg/mL 16.60 10.20     Vitals/Labs  Ht: 182.8 cm (71.97\"); Wt: 81.2 kg (179 lb)   Temp Readings from Last 1 Encounters:   06/25/24 98 °F (36.7 °C) (Oral)     Estimated Creatinine Clearance: 83.3 mL/min (by C-G formula based on SCr of 0.88 mg/dL).     Results from last 7 days   Lab Units 06/25/24  0335 06/24/24  0344 06/23/24  0608   CREATININE mg/dL 0.88 0.89 0.86   WBC 10*3/mm3 6.01 5.18 5.44     Assessment/Plan    Level came back at 16.6 mcg/mL with a corresponding AUC of 514 mg/L.hr. This is within the goal AUC range of 400-600. Continue with current dose.   Next Level Date and Time: Vanc Trough is scheduled for Sunday morning 6/30 at 1000.  We will continue to monitor patient changes and renal function until vancomycin is discontinued or patient is discharged-SCr is stable today at 0.88. BMP has been ordered daily to trend renal function while the patient is on vancomycin.      Thank you for involving pharmacy in this patient's care. Please contact pharmacy with any questions or concerns.       Aileen Ellington, Pharm.D., [facility]   Clinical Pharmacist  Phone Extension #[phone]  "

## 2024-06-25 NOTE — PROGRESS NOTES
LOS: 4 days     Chief Complaint: MRSA septicemia    Interval History: Patient remains afebrile.  Tolerating antibiotics.    Vital Signs  Temp:  [96.8 °F (36 °C)-97.8 °F (36.6 °C)] 97.1 °F (36.2 °C)  Heart Rate:  [81-92] 81  Resp:  [16] 16  BP: (118-126)/(72-78) 123/72    Physical Exam:  General: In no acute distress  HEENT: Oropharynx clear, moist mucous membranes  Respiratory: Normal work of breathing  Skin: No rashes or lesions in exposed areas  Extremities: No edema, cyanosis  Access: Peripheral IV    Antibiotics:  Anti-Infectives (From admission, onward)      Ordered     Dose/Rate Route Frequency Start Stop    06/23/24 1302  vancomycin (VANCOCIN) 1,000 mg in sodium chloride 0.9 % 250 mL IVPB-VTB        Ordering Provider: Tino Ramirez DO    1,000 mg  250 mL/hr over 60 Minutes Intravenous Every 12 Hours 06/23/24 2300 07/05/24 2259    06/21/24 1418  Pharmacy to dose vancomycin        Ordering Provider: Tino Ramirez DO     Does not apply Continuous PRN 06/21/24 1418 08/02/24 1417    06/21/24 1035  piperacillin-tazobactam (ZOSYN) 3.375 g IVPB in 100 mL NS MBP (CD)        Ordering Provider: Samuel Dietrich MD    3.375 g  over 30 Minutes Intravenous Once 06/21/24 1051 06/21/24 1208    06/21/24 1035  vancomycin IVPB 1500 mg in 0.9% NaCl (Premix) 500 mL        Ordering Provider: Samuel Dietrich MD    20 mg/kg × 81.2 kg  333.3 mL/hr over 90 Minutes Intravenous Once 06/21/24 1051 06/21/24 1341             Results Review:     I reviewed the patient's new clinical results.    Lab Results   Component Value Date    WBC 6.01 06/25/2024    HGB 9.8 (L) 06/25/2024    HCT 29.7 (L) 06/25/2024    MCV 86.3 06/25/2024     06/25/2024     Lab Results   Component Value Date    GLUCOSE 121 (H) 06/25/2024    BUN 15 06/25/2024    CREATININE 0.88 06/25/2024    EGFRIFNONA 51 (L) 03/27/2024    EGFRIFAFRI 62 03/27/2024    BCR 17.0 06/25/2024    CO2 26.0 06/25/2024    CALCIUM 9.2 06/25/2024    ALBUMIN 3.6  06/21/2024    AST 18 06/21/2024    ALT 11 06/21/2024       Microbiology:  6/21 blood cultures 2/2 MRSA  6/23 blood cultures pending       Assessment    1.  MRSA bacteremia  2.  Left foot osteomyelitis in the setting of hardware infection on suppressive Bactrim  3.  Diabetes mellitus type 2  4.  Thoracic discitis likely secondary to MRSA bacteremia    TTE remains pending today.  Blood cultures have remained negative.  [facility] reached out and is agreeable with IV antibiotics.  They would prefer midline which is not unreasonable.  Will plan to order midline today.  He will need very close follow-up on discharge with the [facility] clinic.    As long as TTE shows no evidence of valvular dysfunction or abscess then plan for 6-week course of vancomycin through end date 8/2.  Patient will follow-up with [facility] clinic however I will be happy to follow labs in the meantime.  He will need weekly CBC with differential, creatinine and vancomycin level faxed to [phone].

## 2024-06-25 NOTE — PLAN OF CARE
Goal Outcome Evaluation:  Plan of Care Reviewed With: patient        Progress: no change  Outcome Evaluation: VSS, RA, SL, up with SBA, pain manageable with both PO and IV pain meds, dressing changes daily, PICC for 6 weeks ABX, educated on BP monitoring, home on DC when stable

## 2024-06-25 NOTE — PLAN OF CARE
Goal Outcome Evaluation:      Patient is alert x4. Vitals are stable.  Dressing was changed. Assist x1 with ambulation.  Dilaudid and percocet were ordered for pain management.  Vancomycin was ordered for antibiotic therapy.  Vanc trough was drawn this am. Patient to remain on antibiotics for 6 weeks.  Patient left for a CT of the LLE, and a MARCELLUS.  Patient remains in contact isolation for MRSA. No signs of distress noted.  Will continue to monitor and update accordingly.

## 2024-06-25 NOTE — CASE MANAGEMENT/SOCIAL WORK
Continued Stay Note  [facility]     Patient Name: Onel Campbell  MRN: [id number]  Today's Date: 6/25/2024    Admit Date: 6/21/2024    Plan: Home with family support, [facility].   Discharge Plan       Row Name 06/25/24 0941       Plan    Plan Home with family support, [facility].    Patient/Family in Agreement with Plan yes    Plan Comments ID MD plans for a 6-week course of IV antibiotics. Called and left a message for Sulma/CANDIDO to discuss. [facility] who is following. IV placement (Midline vs PICC) is pending. CCP to follow.                   Discharge Codes    No documentation.                 Expected Discharge Date and Time       Expected Discharge Date Expected Discharge Time    Jun 26, 2024               Irina FULLER RN

## 2024-06-25 NOTE — NURSING NOTE
IV team consulted for midline placement but per MD notes discharge Ivabx will be for 6 weeks.  I reached out to primary RN who states she will contact MD and get clarification on line ordered.  Will follow.

## 2024-06-25 NOTE — SIGNIFICANT NOTE
06/25/24 4081   OTHER   Discipline physical therapist   Rehab Time/Intention   Session Not Performed other (see comments)  (checked on pt this afternoon, unsure of plans at this time regarding sx, pt would like to stay in bed- he is moving ok and up with just SBA but reports back pain and would prefer to rest at this time. Will follow up tomorrow.)   Recommendation   PT - Next Appointment 06/26/24

## 2024-06-25 NOTE — PROGRESS NOTES
Name: Onel Campbell ADMIT: 2024   : 1950  PCP: Asif Patricia MD    MRN: [id number] LOS: 4 days   AGE/SEX: 74 y.o. male  ROOM: [facility]     Subjective   Subjective   Patient resting in bed.  Continues to have intermittent back pain.  Blood cultures have been negative for 48 hours. Wife at bedside.    Review of Systems   Constitutional:  Negative for chills and fever.   Respiratory:  Negative for cough and shortness of breath.    Cardiovascular:  Negative for chest pain and palpitations.   Gastrointestinal:  Negative for abdominal pain, diarrhea, nausea and vomiting.   Musculoskeletal:  Positive for back pain.     As above     Objective   Objective   Vital Signs  Temp:  [96.8 °F (36 °C)-97.8 °F (36.6 °C)] 97.1 °F (36.2 °C)  Heart Rate:  [81-92] 81  Resp:  [16] 16  BP: (118-123)/(72-78) 123/72  SpO2:  [97 %-98 %] 98 %  on   ;   Device (Oxygen Therapy): CPAP  Body mass index is 24.3 kg/m².  Physical Exam  Vitals and nursing note reviewed.   Constitutional:       General: He is not in acute distress.  Cardiovascular:      Rate and Rhythm: Normal rate and regular rhythm.   Pulmonary:      Effort: Pulmonary effort is normal. No respiratory distress.   Abdominal:      General: Abdomen is flat. There is no distension.      Tenderness: There is no abdominal tenderness.   Musculoskeletal:         General: No swelling or deformity.   Skin:     General: Skin is warm and dry.   Neurological:      General: No focal deficit present.      Mental Status: He is alert. Mental status is at baseline.      Sensory: No sensory deficit.      Motor: No weakness.         Results Review     I reviewed the patient's new clinical results.  Results from last 7 days   Lab Units 24  0335 24  0344 24  0608 24  0248   WBC 10*3/mm3 6.01 5.18 5.44 4.99   HEMOGLOBIN g/dL 9.8* 9.6* 9.6* 9.6*   PLATELETS 10*3/mm3 293 199 223 188     Results from last 7 days   Lab Units 24  0335 24  0344  "06/23/24  0608 06/22/24  0248   SODIUM mmol/L 137 136 139 138   POTASSIUM mmol/L 4.5 4.2 4.3 4.2   CHLORIDE mmol/L 104 102 104 103   CO2 mmol/L 26.0 27.0 28.0 28.0   BUN mg/dL 15 13 13 12   CREATININE mg/dL 0.88 0.89 0.86 0.87   GLUCOSE mg/dL 121* 108* 114* 94   Estimated Creatinine Clearance: 83.3 mL/min (by C-G formula based on SCr of 0.88 mg/dL).  Results from last 7 days   Lab Units 06/21/24  0800   ALBUMIN g/dL 3.6   BILIRUBIN mg/dL 0.8   ALK PHOS U/L 226*   AST (SGOT) U/L 18   ALT (SGPT) U/L 11     Results from last 7 days   Lab Units 06/25/24  0335 06/24/24  0344 06/23/24  0608 06/22/24  0248 06/21/24  0800   CALCIUM mg/dL 9.2 9.0 9.1 8.9 9.5   ALBUMIN g/dL  --   --   --   --  3.6     Results from last 7 days   Lab Units 06/21/24  1432 06/21/24  1107   LACTATE mmol/L 1.0 2.2*   No results found for: \"COVID19\"  Glucose   Date/Time Value Ref Range Status   06/23/2024 0709 116 70 - 130 mg/dL Final   06/22/2024 2029 147 (H) 70 - 130 mg/dL Final   06/22/2024 1725 118 70 - 130 mg/dL Final   06/22/2024 1125 126 70 - 130 mg/dL Final       XR Foot 3+ View Left, XR Ankle 2 View Left  Narrative: 2 VIEWS LEFT ANKLE; 3 VIEWS LEFT FOOT     HISTORY: Hardware infection     COMPARISON: None available.     FINDINGS:  Left ankle: Unfortunately, this patient's prior imaging is not available  for parison. No definite acute fracture or subluxation of the left ankle  is seen. There is diffuse soft tissue swelling noted about the ankle.     Left foot: Again, unfortunately, patient's prior imaging is unavailable.  There are extensive postsurgical changes involving the hindfoot. I don't  see any definite perihardware lucency to suggest loosening. There is no  hardware fracture. Diffuse soft tissue swelling of the left foot is  noted, as are vascular calcifications.     Impression: Exam is difficult to interpret in the absence of prior studies for  comparison. No obvious acute fracture or subluxation is seen. There are  extensive " postsurgical changes of the hindfoot.     This report was finalized on 6/24/2024 10:15 PM by Dr. Louisa Carter M.D on Workstation: [id number]       I reviewed the patient's daily medications.  Scheduled Medications  gabapentin, 300 mg, Oral, Nightly  Lidocaine, 1 patch, Transdermal, Q24H  metoprolol succinate XL, 50 mg, Oral, Daily  senna-docusate sodium, 2 tablet, Oral, BID  vancomycin, 1,000 mg, Intravenous, Q12H    Infusions  Pharmacy to dose vancomycin,     Diet  Diet: Cardiac, Diabetic; Healthy Heart (2-3 Na+); Consistent Carbohydrate; Fluid Consistency: Thin (IDDSI 0)         I have personally reviewed:  [x]  Laboratory   [x]  Microbiology   [x]  Radiology   []  EKG/Telemetry   []  Cardiology/Vascular   []  Pathology   []  Records     Assessment/Plan     Active Hospital Problems    Diagnosis  POA    **Discitis of thoracic region [M46.44]  Yes    Opioid dependence [F11.20]  Yes    Subacute osteomyelitis of left ankle [M86.272]  Yes    Discitis [M46.40]  Yes    Obstructive sleep apnea of adult [G47.33]  Yes    Type 2 diabetes mellitus with other specified complication, without long-term current use of insulin [E11.69]  Yes    History of MRSA infection [Z86.14]  Yes    Peripheral vascular disease [I73.9]  Yes    Essential hypertension [I10]  Yes    Gastroesophageal reflux disease [K21.9]  Yes      Resolved Hospital Problems   No resolved problems to display.       74 y.o. male admitted with Discitis of thoracic region.    MSRA bacteremia  Left foot osteomyelitis with hardware infection on suppressive Bactrim  T3-T4 discitis with small abscess  -Discussed with infectious disease, continue vancomycin.  Plan for MARCELLUS.  Cardiology has been consulted  -Neurosurgery consulted, no surgery needed.  Plan to follow-up as an outpatient.  -Continue pain regimen.  Bowel regimen scheduled  -Repeat blood cultures no growth for 48 hours  -Have discussed with podiatry of infectious disease.  Podiatry plans on CT scan and  possible intervention for definitive source control.  ID has discussed with [facility] infectious disease and they would prefer a midline and plan to follow him up in the [facility].  Plan for IV vancomycin until 8/2.  Will have weekly CBC with differential, creatinine, vancomycin level faxed to [phone] until he follows up with a [facility] clinic.  -Plan to hold off on midline until closer to discharge pending surgical plans    Hypertension  -Coreg previously discontinued and then started on metoprolol's for rebound tachycardia as outpatient office    Diabetes type 2 with neuropathy  -On Mounjaro and Jardiance as an outpatient  -Accu-Cheks have been all at goal since admission, discontinue Accu-Chek and sliding scale insulin  -Continue gabapentin    Substance use disorder    SCDs for DVT prophylaxis.  Full code.  Discussed with patient, family, and nursing staff.  Anticipate discharge home with family later this week.pending surgical plans    Expected Discharge Date: 6/26/2024; Expected Discharge Time:       Yomi Goldsmith MD  [facility]  06/25/24  10:58 EDT

## 2024-06-26 LAB
ANION GAP SERPL CALCULATED.3IONS-SCNC: 7.5 MMOL/L (ref 5–15)
BASOPHILS # BLD AUTO: 0.03 10*3/MM3 (ref 0–0.2)
BASOPHILS NFR BLD AUTO: 0.6 % (ref 0–1.5)
BUN SERPL-MCNC: 12 MG/DL (ref 8–23)
BUN/CREAT SERPL: 14 (ref 7–25)
CALCIUM SPEC-SCNC: 9.7 MG/DL (ref 8.6–10.5)
CHLORIDE SERPL-SCNC: 104 MMOL/L (ref 98–107)
CO2 SERPL-SCNC: 26.5 MMOL/L (ref 22–29)
CREAT SERPL-MCNC: 0.86 MG/DL (ref 0.76–1.27)
DEPRECATED RDW RBC AUTO: 41.5 FL (ref 37–54)
EGFRCR SERPLBLD CKD-EPI 2021: 90.3 ML/MIN/1.73
EOSINOPHIL # BLD AUTO: 0.31 10*3/MM3 (ref 0–0.4)
EOSINOPHIL NFR BLD AUTO: 5.9 % (ref 0.3–6.2)
ERYTHROCYTE [DISTWIDTH] IN BLOOD BY AUTOMATED COUNT: 13.4 % (ref 12.3–15.4)
GLUCOSE SERPL-MCNC: 108 MG/DL (ref 65–99)
HCT VFR BLD AUTO: 33 % (ref 37.5–51)
HGB BLD-MCNC: 10.7 G/DL (ref 13–17.7)
IMM GRANULOCYTES # BLD AUTO: 0.03 10*3/MM3 (ref 0–0.05)
IMM GRANULOCYTES NFR BLD AUTO: 0.6 % (ref 0–0.5)
LYMPHOCYTES # BLD AUTO: 1.37 10*3/MM3 (ref 0.7–3.1)
LYMPHOCYTES NFR BLD AUTO: 26.1 % (ref 19.6–45.3)
MCH RBC QN AUTO: 27.9 PG (ref 26.6–33)
MCHC RBC AUTO-ENTMCNC: 32.4 G/DL (ref 31.5–35.7)
MCV RBC AUTO: 86.2 FL (ref 79–97)
MONOCYTES # BLD AUTO: 0.36 10*3/MM3 (ref 0.1–0.9)
MONOCYTES NFR BLD AUTO: 6.9 % (ref 5–12)
NEUTROPHILS NFR BLD AUTO: 3.15 10*3/MM3 (ref 1.7–7)
NEUTROPHILS NFR BLD AUTO: 59.9 % (ref 42.7–76)
NRBC BLD AUTO-RTO: 0 /100 WBC (ref 0–0.2)
PLATELET # BLD AUTO: 222 10*3/MM3 (ref 140–450)
PMV BLD AUTO: 7.5 FL (ref 6–12)
POTASSIUM SERPL-SCNC: 4.4 MMOL/L (ref 3.5–5.2)
RBC # BLD AUTO: 3.83 10*6/MM3 (ref 4.14–5.8)
SODIUM SERPL-SCNC: 138 MMOL/L (ref 136–145)
WBC NRBC COR # BLD AUTO: 5.25 10*3/MM3 (ref 3.4–10.8)

## 2024-06-26 PROCEDURE — 25010000002 HYDROMORPHONE PER 4 MG: Performed by: HOSPITALIST

## 2024-06-26 PROCEDURE — 25010000002 VANCOMYCIN 1 G RECONSTITUTED SOLUTION 1 EACH VIAL: Performed by: STUDENT IN AN ORGANIZED HEALTH CARE EDUCATION/TRAINING PROGRAM

## 2024-06-26 PROCEDURE — 25010000002 KETOROLAC TROMETHAMINE PER 15 MG: Performed by: STUDENT IN AN ORGANIZED HEALTH CARE EDUCATION/TRAINING PROGRAM

## 2024-06-26 PROCEDURE — 25810000003 SODIUM CHLORIDE 0.9 % SOLUTION 250 ML FLEX CONT: Performed by: STUDENT IN AN ORGANIZED HEALTH CARE EDUCATION/TRAINING PROGRAM

## 2024-06-26 PROCEDURE — 85025 COMPLETE CBC W/AUTO DIFF WBC: CPT | Performed by: NURSE PRACTITIONER

## 2024-06-26 PROCEDURE — 80048 BASIC METABOLIC PNL TOTAL CA: CPT | Performed by: HOSPITALIST

## 2024-06-26 PROCEDURE — 97530 THERAPEUTIC ACTIVITIES: CPT

## 2024-06-26 RX ORDER — KETOROLAC TROMETHAMINE 15 MG/ML
15 INJECTION, SOLUTION INTRAMUSCULAR; INTRAVENOUS EVERY 6 HOURS PRN
Status: DISCONTINUED | OUTPATIENT
Start: 2024-06-26 | End: 2024-06-27 | Stop reason: HOSPADM

## 2024-06-26 RX ADMIN — HYDROMORPHONE HYDROCHLORIDE 0.5 MG: 1 INJECTION, SOLUTION INTRAMUSCULAR; INTRAVENOUS; SUBCUTANEOUS at 01:07

## 2024-06-26 RX ADMIN — OXYCODONE AND ACETAMINOPHEN 1 TABLET: 7.5; 325 TABLET ORAL at 20:50

## 2024-06-26 RX ADMIN — HYDROMORPHONE HYDROCHLORIDE 0.5 MG: 1 INJECTION, SOLUTION INTRAMUSCULAR; INTRAVENOUS; SUBCUTANEOUS at 13:30

## 2024-06-26 RX ADMIN — OXYCODONE AND ACETAMINOPHEN 1 TABLET: 7.5; 325 TABLET ORAL at 16:53

## 2024-06-26 RX ADMIN — OXYCODONE AND ACETAMINOPHEN 1 TABLET: 7.5; 325 TABLET ORAL at 12:14

## 2024-06-26 RX ADMIN — OXYCODONE AND ACETAMINOPHEN 1 TABLET: 7.5; 325 TABLET ORAL at 08:24

## 2024-06-26 RX ADMIN — OXYCODONE AND ACETAMINOPHEN 1 TABLET: 7.5; 325 TABLET ORAL at 04:27

## 2024-06-26 RX ADMIN — HYDROMORPHONE HYDROCHLORIDE 0.5 MG: 1 INJECTION, SOLUTION INTRAMUSCULAR; INTRAVENOUS; SUBCUTANEOUS at 18:04

## 2024-06-26 RX ADMIN — VANCOMYCIN HYDROCHLORIDE 1000 MG: 1 INJECTION, POWDER, LYOPHILIZED, FOR SOLUTION INTRAVENOUS at 12:05

## 2024-06-26 RX ADMIN — GABAPENTIN 300 MG: 300 CAPSULE ORAL at 20:50

## 2024-06-26 RX ADMIN — HYDROMORPHONE HYDROCHLORIDE 0.5 MG: 1 INJECTION, SOLUTION INTRAMUSCULAR; INTRAVENOUS; SUBCUTANEOUS at 05:06

## 2024-06-26 RX ADMIN — VANCOMYCIN HYDROCHLORIDE 1000 MG: 1 INJECTION, POWDER, LYOPHILIZED, FOR SOLUTION INTRAVENOUS at 22:55

## 2024-06-26 RX ADMIN — METOPROLOL SUCCINATE 50 MG: 50 TABLET, FILM COATED, EXTENDED RELEASE ORAL at 08:24

## 2024-06-26 RX ADMIN — KETOROLAC TROMETHAMINE 15 MG: 15 INJECTION, SOLUTION INTRAMUSCULAR; INTRAVENOUS at 20:50

## 2024-06-26 RX ADMIN — HYDROMORPHONE HYDROCHLORIDE 0.5 MG: 1 INJECTION, SOLUTION INTRAMUSCULAR; INTRAVENOUS; SUBCUTANEOUS at 09:28

## 2024-06-26 NOTE — PROGRESS NOTES
Name: Onel Campbell ADMIT: 2024   : 1950  PCP: Asif Patricia MD    MRN: [id number] LOS: 5 days   AGE/SEX: 74 y.o. male  ROOM: [facility]     Subjective   Subjective   Patient resting in bed.  Continues to have back pain and using a significant amount of pain medication.  Blood cultures have been negative for 72 hours. Wife at bedside.    Review of Systems   Constitutional:  Negative for chills and fever.   Respiratory:  Negative for cough and shortness of breath.    Cardiovascular:  Negative for chest pain and palpitations.   Gastrointestinal:  Negative for abdominal pain, diarrhea, nausea and vomiting.   Musculoskeletal:  Positive for back pain.     As above     Objective   Objective   Vital Signs  Temp:  [97.7 °F (36.5 °C)-99 °F (37.2 °C)] 98.7 °F (37.1 °C)  Heart Rate:  [67-81] 77  Resp:  [16] 16  BP: (116-146)/(67-81) 125/70  SpO2:  [97 %-99 %] 97 %  on   ;   Device (Oxygen Therapy): room air  Body mass index is 24.97 kg/m².  Physical Exam  Vitals and nursing note reviewed.   Constitutional:       General: He is not in acute distress.  Cardiovascular:      Rate and Rhythm: Normal rate and regular rhythm.   Pulmonary:      Effort: Pulmonary effort is normal. No respiratory distress.   Abdominal:      General: Abdomen is flat. There is no distension.      Tenderness: There is no abdominal tenderness.   Musculoskeletal:         General: No swelling or deformity.   Skin:     General: Skin is warm and dry.   Neurological:      General: No focal deficit present.      Mental Status: He is alert. Mental status is at baseline.      Sensory: No sensory deficit.      Motor: No weakness.         Results Review     I reviewed the patient's new clinical results.  Results from last 7 days   Lab Units 24  0429 24  0335 24  0344 24  0608   WBC 10*3/mm3 5.25 6.01 5.18 5.44   HEMOGLOBIN g/dL 10.7* 9.8* 9.6* 9.6*   PLATELETS 10*3/mm3 222 293 199 223     Results from last 7 days   Lab  "Units 06/26/24  0429 06/25/24  0335 06/24/24  0344 06/23/24  0608   SODIUM mmol/L 138 137 136 139   POTASSIUM mmol/L 4.4 4.5 4.2 4.3   CHLORIDE mmol/L 104 104 102 104   CO2 mmol/L 26.5 26.0 27.0 28.0   BUN mg/dL 12 15 13 13   CREATININE mg/dL 0.86 0.88 0.89 0.86   GLUCOSE mg/dL 108* 121* 108* 114*   Estimated Creatinine Clearance: 85.2 mL/min (by C-G formula based on SCr of 0.86 mg/dL).  Results from last 7 days   Lab Units 06/21/24  0800   ALBUMIN g/dL 3.6   BILIRUBIN mg/dL 0.8   ALK PHOS U/L 226*   AST (SGOT) U/L 18   ALT (SGPT) U/L 11     Results from last 7 days   Lab Units 06/26/24  0429 06/25/24  0335 06/24/24  0344 06/23/24  0608 06/22/24  0248 06/21/24  0800   CALCIUM mg/dL 9.7 9.2 9.0 9.1   < > 9.5   ALBUMIN g/dL  --   --   --   --   --  3.6    < > = values in this interval not displayed.     Results from last 7 days   Lab Units 06/21/24  1432 06/21/24  1107   LACTATE mmol/L 1.0 2.2*   No results found for: \"COVID19\"  No results found for: \"HGBA1C\", \"POCGLU\"      Adult Transthoracic Echo Complete W/ Cont if Necessary Per Protocol    Left ventricular systolic function is normal. Calculated left   ventricular EF = 61.1%    Left ventricular diastolic function was not assessed.    I reviewed the patient's daily medications.  Scheduled Medications  gabapentin, 300 mg, Oral, Nightly  Lidocaine, 1 patch, Transdermal, Q24H  metoprolol succinate XL, 50 mg, Oral, Daily  senna-docusate sodium, 2 tablet, Oral, BID  vancomycin, 1,000 mg, Intravenous, Q12H    Infusions  Pharmacy to dose vancomycin,     Diet  Diet: Cardiac, Diabetic; Healthy Heart (2-3 Na+); Consistent Carbohydrate; Fluid Consistency: Thin (IDDSI 0)         I have personally reviewed:  [x]  Laboratory   [x]  Microbiology   [x]  Radiology   []  EKG/Telemetry   [x]  Cardiology/Vascular   []  Pathology   []  Records     Assessment/Plan     Active Hospital Problems    Diagnosis  POA    **Discitis of thoracic region [M46.44]  Yes    Opioid dependence [F11.20]  " Yes    Subacute osteomyelitis of left ankle [M86.272]  Yes    Discitis [M46.40]  Yes    Obstructive sleep apnea of adult [G47.33]  Yes    Type 2 diabetes mellitus with other specified complication, without long-term current use of insulin [E11.69]  Yes    History of MRSA infection [Z86.14]  Yes    Peripheral vascular disease [I73.9]  Yes    Essential hypertension [I10]  Yes    Gastroesophageal reflux disease [K21.9]  Yes      Resolved Hospital Problems   No resolved problems to display.       74 y.o. male admitted with Discitis of thoracic region.    MSRA bacteremia  Left foot osteomyelitis with hardware infection on suppressive Bactrim  T3-T4 discitis with small abscess  -Echo without any evidence of endocarditis  -Neurosurgery consulted, no surgery needed.  Plan to follow-up as an outpatient.  -Continue pain regimen.  Add Toradol.  Bowel regimen scheduled  -Repeat blood cultures no growth for 72 hours  -Have discussed with podiatry of infectious disease.  Podiatry plans on CT scan and possible intervention for definitive source control.  ID has discussed with [facility] infectious disease and they would prefer a midline and plan to follow him up in the [facility].  Plan for IV vancomycin until 8/2.  Will have weekly CBC with differential, creatinine, vancomycin level faxed to [phone] until he follows up with a [facility] clinic.  -Plan to hold off on midline until closer to discharge pending surgical plans    Hypertension  -Coreg previously discontinued and then started on metoprolol's for rebound tachycardia as outpatient office    Diabetes type 2 with neuropathy  -On Mounjaro and Jardiance as an outpatient  -Accu-Cheks have been all at goal since admission, discontinue Accu-Chek and sliding scale insulin  -Continue gabapentin    Substance use disorder, in remission for the last 10 years    SCDs for DVT prophylaxis.  Full code.  Discussed with patient, family, and nursing staff.  Anticipate discharge home with family  later this week.pending surgical plans    Expected Discharge Date: 6/27/2024; Expected Discharge Time:       Yomi Goldsmith MD  [facility]  06/26/24  08:53 EDT

## 2024-06-26 NOTE — PLAN OF CARE
Goal Outcome Evaluation:  Plan of Care Reviewed With: patient        Progress: improving  Outcome Evaluation: Pt received sitting UIC and agreeable to PT. CAM boot donned prior mobility. Pt stood and ambulated independently with use of RW in hallway. Pt appears to be returning to his functional baseline with the main limiting factor is pain. Pt plans home at D/C with spouse. No acute skilled PT needs required at this time. Acute PT will sign off. RN notified.      Anticipated Discharge Disposition (PT): home with assist, home with outpatient therapy services

## 2024-06-26 NOTE — PROGRESS NOTES
"[facility] Clinical Pharmacy Services: Vancomycin Monitoring Note    Onel Campbell is a 74 y.o. male who is on day 6/42 of pharmacy to dose vancomycin for MRSA septicemia and L foot osteomyelitis in the setting of hardware infection.     Previous Vancomycin Dose: 1000 mg IV every 12 hours    Updated Cultures and Sensitivities:   6/21: BCx-MRSA (2 of 2 sets)  6/23: BCx-NG at 72 hours    Results from last 7 days   Lab Units 06/25/24  1000 06/23/24  1102   VANCOMYCIN TR mcg/mL 16.60 10.20     Vitals/Labs  Ht: 180.3 cm (71\"); Wt: 81.2 kg (179 lb)   Temp Readings from Last 1 Encounters:   06/26/24 98.7 °F (37.1 °C) (Oral)     Estimated Creatinine Clearance: 85.2 mL/min (by C-G formula based on SCr of 0.86 mg/dL).     Results from last 7 days   Lab Units 06/26/24  0429 06/25/24  0335 06/24/24  0344   CREATININE mg/dL 0.86 0.88 0.89   WBC 10*3/mm3 5.25 6.01 5.18     Assessment/Plan    Current Vancomycin Dose: 1000 mg IV every 12 hours; provides a predicted  mg/L.hr   Next Level Date and Time: Vanc Trough is scheduled for Sunday morning 6/30 at 1000.  We will continue to monitor patient changes and renal function until vancomycin is discontinued or patient is discharged-SCr remains stable at 0.86. BMP has been ordered daily to trend renal function while the patient is on vancomycin.      Thank you for involving pharmacy in this patient's care. Please contact pharmacy with any questions or concerns.       Aileen Ellington, Pharm.D., [facility]   Clinical Pharmacist  Phone Extension #[phone]  "

## 2024-06-26 NOTE — PLAN OF CARE
Goal Outcome Evaluation:  Plan of Care Reviewed With: patient        Progress: no change  Outcome Evaluation: VSS, RA, SL, up with SBA, pain manageable with both PO and IV pain meds, dressing changes daily, PICC for 6 weeks ABX, educated on BP monitoring, home on DC when stable  CT and Echo completed, vanc trough due 6/30@1000

## 2024-06-26 NOTE — SIGNIFICANT NOTE
06/26/24 0841   OTHER   Discipline physical therapist   Rehab Time/Intention   Session Not Performed other (see comments)  (Pt reports he had a rough night and is having too much pain to progress mobility at this time. PT encouraged pt to sit UIC and ambulate with staff as tolerated. PT will f/u as time allows or next service date)   Therapy Assessment/Plan (PT)   Criteria for Skilled Interventions Met (PT) yes   Recommendation   PT - Next Appointment 06/27/24

## 2024-06-26 NOTE — THERAPY DISCHARGE NOTE
Patient Name: Onel Campbell  : 1950    MRN: [id number]                              Today's Date: 2024       Admit Date: 2024    Visit Dx:     ICD-10-CM ICD-9-CM   1. Discitis of thoracic region  M46.44 722.92     Patient Active Problem List   Diagnosis    Stage 3 chronic kidney disease    Drug dependence    Charcot's joint of foot    Essential hypertension    Peripheral vascular disease    Gastroesophageal reflux disease    History of prostate cancer    History of MRSA infection    Obstructive sleep apnea of adult    Discitis of thoracic region    Type 2 diabetes mellitus with other specified complication, without long-term current use of insulin    Opioid dependence    Subacute osteomyelitis of left ankle    Discitis     Past Medical History:   Diagnosis Date    Diabetes     GERD (gastroesophageal reflux disease)     Hypertension     Prostate cancer     Renal disease     Sleep apnea      History reviewed. No pertinent surgical history.   General Information       Row Name 24 1450          Physical Therapy Time and Intention    Document Type discharge treatment  -CS     Mode of Treatment individual therapy;physical therapy  -CS       Row Name 24 1452          General Information    Patient Profile Reviewed yes  -CS     Existing Precautions/Restrictions no known precautions/restrictions  -CS       Row Name 24 1450          Cognition    Orientation Status (Cognition) oriented x 4  -CS       Row Name 24 1455          Safety Issues, Functional Mobility    Impairments Affecting Function (Mobility) pain;strength  -CS               User Key  (r) = Recorded By, (t) = Taken By, (c) = Cosigned By      Initials Name Provider Type    CS Joellen Hua, PT Physical Therapist                   Mobility       Row Name 24 1450          Bed Mobility    Comment, (Bed Mobility) NT - UIC  -CS       Row Name 24 1458          Sit-Stand Transfer    Sit-Stand [place]  (Transfers) modified independence  -CS     Assistive Device (Sit-Stand Transfers) walker, front-wheeled  -CS       Row Name 06/26/24 1455          Gait/Stairs (Locomotion)    [place] Level (Gait) modified independence  -CS     Assistive Device (Gait) walker, front-wheeled  -CS     Distance in Feet (Gait) 75  -CS     Deviations/Abnormal Patterns (Gait) pierre decreased  -CS     Left Sided Gait Deviations weight shift ability decreased  -CS       Row Name 06/26/24 1455          Mobility    Extremity Weight-bearing Status left lower extremity  -CS     Left Lower Extremity (Weight-bearing Status) weight-bearing as tolerated (WBAT)  CAM boot  -CS               User Key  (r) = Recorded By, (t) = Taken By, (c) = Cosigned By      Initials Name Provider Type    CS Joellen Hua, PT Physical Therapist                   Obj/Interventions    No documentation.                  Goals/Plan       Row Name 06/26/24 1502          Bed Mobility Goal 1 (PT)    Activity/Assistive Device (Bed Mobility Goal 1, PT) bed mobility activities, all  -CS     [place] Level/Cues Needed (Bed Mobility Goal 1, PT) independent  -CS     Time Frame (Bed Mobility Goal 1, PT) 2 weeks  -CS     Progress/Outcomes (Bed Mobility Goal 1, PT) goal met  -CS       Row Name 06/26/24 1502          Transfer Goal 1 (PT)    Activity/Assistive Device (Transfer Goal 1, PT) sit-to-stand/stand-to-sit;bed-to-chair/chair-to-bed  -CS     [place] Level/Cues Needed (Transfer Goal 1, PT) modified independence  -CS     Time Frame (Transfer Goal 1, PT) 2 weeks  -CS     Progress/Outcome (Transfer Goal 1, PT) goal met  -CS       Row Name 06/26/24 1502          Gait Training Goal 1 (PT)    Activity/Assistive Device (Gait Training Goal 1, PT) gait (walking locomotion);assistive device use  -CS     [place] Level (Gait Training Goal 1, PT) modified independence  -CS     Distance (Gait Training Goal 1, PT) 100'  -CS     Time Frame (Gait Training Goal 1, PT) 2 weeks   -CS     Progress/Outcome (Gait Training Goal 1, PT) goal partially met  -CS               User Key  (r) = Recorded By, (t) = Taken By, (c) = Cosigned By      Initials Name Provider Type    Joellen Pulliam, PT Physical Therapist                   Clinical Impression       Row Name 06/26/24 1456          Plan of Care Review    Plan of Care Reviewed With patient  -CS     Progress improving  -CS     Outcome Evaluation Pt received sitting UIC and agreeable to PT. CAM boot donned prior mobility. Pt stood and ambulated independently with use of RW in hallway. Pt appears to be returning to his functional baseline with the main limiting factor is pain. Pt plans home at D/C with spouse. No acute skilled PT needs required at this time. Acute PT will sign off. RN notified.  -       Row Name 06/26/24 1456          Therapy Assessment/Plan (PT)    Criteria for Skilled Interventions Met (PT) no;no problems identified which require skilled intervention  -CS       Row Name 06/26/24 1456          Positioning and Restraints    Pre-Treatment Position sitting in chair/recliner  -CS     Post Treatment Position chair  -CS     In Chair sitting;call light within reach;encouraged to call for assist;notified nsg;with family/caregiver  -CS               User Key  (r) = Recorded By, (t) = Taken By, (c) = Cosigned By      Initials Name Provider Type    Joellen Pulliam, PT Physical Therapist                   Outcome Measures       Row Name 06/26/24 1502 06/26/24 0824       How much help from another person do you currently need...    Turning from your back to your side while in flat bed without using bedrails? 4  -CS 4  -LJ    Moving from lying on back to sitting on the side of a flat bed without bedrails? 4  -CS 4  -LJ    Moving to and from a bed to a chair (including a wheelchair)? 4  -CS 4  -LJ    Standing up from a chair using your arms (e.g., wheelchair, bedside chair)? 4  -CS 4  -LJ    Climbing 3-5 steps with a railing? 3  -CS 3   -    To walk in hospital room? 4  - 4  -LJ    AM-PAC 6 Clicks Score (PT) 23  - 23  -LJ    Highest Level of Mobility Goal 7 --> Walk 25 feet or more  - 7 --> Walk 25 feet or more  -      Row Name 06/26/24 1502          Functional Assessment    Outcome Measure Options AM-PAC 6 Clicks Basic Mobility (PT)  -               User Key  (r) = Recorded By, (t) = Taken By, (c) = Cosigned By      Initials Name Provider Type    Sasha Kennedy, RN Registered Nurse    Joellen Pulliam, PT Physical Therapist                  Physical Therapy Education       Title: PT OT SLP Therapies (Done)       Topic: Physical Therapy (Done)       Point: Mobility training (Done)       Learning Progress Summary             Patient Acceptance, E,TB, VU,DU by  at 6/26/2024 1502    Acceptance, E,TB, VU,DU,NR by  at 6/23/2024 0934   Family Acceptance, E,TB, VU,DU,NR by  at 6/23/2024 0934                         Point: Home exercise program (Done)       Learning Progress Summary             Patient Acceptance, E,TB, VU,DU by  at 6/26/2024 1502    Acceptance, E,TB, VU,DU,NR by  at 6/23/2024 0934   Family Acceptance, E,TB, VU,DU,NR by  at 6/23/2024 0934                         Point: Body mechanics (Done)       Learning Progress Summary             Patient Acceptance, E,TB, VU,DU by  at 6/26/2024 1502    Acceptance, E,TB, VU,DU,NR by  at 6/23/2024 0934   Family Acceptance, E,TB, VU,DU,NR by  at 6/23/2024 0934                         Point: Precautions (Done)       Learning Progress Summary             Patient Acceptance, E,TB, VU,DU by  at 6/26/2024 1502    Acceptance, E,TB, VU,DU,NR by  at 6/23/2024 0934   Family Acceptance, E,TB, VU,DU,NR by  at 6/23/2024 0934                                         User Key       Initials Effective Dates Name Provider Type Discipline     09/22/22 -  Joellen Hua, PT Physical Therapist PT                  PT Recommendation and Plan     Plan of Care Reviewed With:  patient  Progress: improving  Outcome Evaluation: Pt received sitting UIC and agreeable to PT. CAM boot donned prior mobility. Pt stood and ambulated independently with use of RW in hallway. Pt appears to be returning to his functional baseline with the main limiting factor is pain. Pt plans home at D/C with spouse. No acute skilled PT needs required at this time. Acute PT will sign off. RN notified.     Time Calculation:         PT Charges       Row Name 06/26/24 1502 06/26/24 0841          Time Calculation    Start Time 1434  -CS --     Stop Time 1447  -CS --     Time Calculation (min) 13 min  -CS --     PT Received On 06/26/24  -CS --     PT - Next Appointment -- 06/27/24  -CS        Time Calculation- PT    Total Timed Code Minutes- PT 12 minute(s)  -CS --        Timed Charges    49190 - PT Therapeutic Activity Minutes 12  -CS --        Total Minutes    Timed Charges Total Minutes 12  -CS --      Total Minutes 12  -CS --               User Key  (r) = Recorded By, (t) = Taken By, (c) = Cosigned By      Initials Name Provider Type    CS Joellen Hua, PT Physical Therapist                  Therapy Charges for Today       Code Description Service Date Service Provider Modifiers Qty    [id number]  PT THERAPEUTIC ACT EA 15 MIN 6/26/2024 Joellen Hua, PT GP 1            PT G-Codes  Outcome Measure Options: AM-PAC 6 Clicks Basic Mobility (PT)  AM-PAC 6 Clicks Score (PT): 23    PT Discharge Summary  Anticipated Discharge Disposition (PT): home with assist, home with outpatient therapy services    Joellen Hua PT  6/26/2024

## 2024-06-27 ENCOUNTER — READMISSION MANAGEMENT (OUTPATIENT)
Dept: CALL CENTER | Facility: HOSPITAL | Age: 74
End: 2024-06-27
Payer: MEDICARE

## 2024-06-27 VITALS
DIASTOLIC BLOOD PRESSURE: 64 MMHG | TEMPERATURE: 97.4 F | BODY MASS INDEX: 25.06 KG/M2 | RESPIRATION RATE: 17 BRPM | HEART RATE: 92 BPM | SYSTOLIC BLOOD PRESSURE: 116 MMHG | WEIGHT: 179 LBS | OXYGEN SATURATION: 96 % | HEIGHT: 71 IN

## 2024-06-27 PROCEDURE — 25010000002 VANCOMYCIN 1 G RECONSTITUTED SOLUTION 1 EACH VIAL: Performed by: STUDENT IN AN ORGANIZED HEALTH CARE EDUCATION/TRAINING PROGRAM

## 2024-06-27 PROCEDURE — 25010000002 KETOROLAC TROMETHAMINE PER 15 MG: Performed by: STUDENT IN AN ORGANIZED HEALTH CARE EDUCATION/TRAINING PROGRAM

## 2024-06-27 PROCEDURE — C1751 CATH, INF, PER/CENT/MIDLINE: HCPCS

## 2024-06-27 PROCEDURE — 02HV33Z INSERTION OF INFUSION DEVICE INTO SUPERIOR VENA CAVA, PERCUTANEOUS APPROACH: ICD-10-PCS | Performed by: STUDENT IN AN ORGANIZED HEALTH CARE EDUCATION/TRAINING PROGRAM

## 2024-06-27 RX ORDER — SODIUM CHLORIDE 0.9 % (FLUSH) 0.9 %
20 SYRINGE (ML) INJECTION AS NEEDED
Status: DISCONTINUED | OUTPATIENT
Start: 2024-06-27 | End: 2024-06-27 | Stop reason: HOSPADM

## 2024-06-27 RX ORDER — SODIUM CHLORIDE 0.9 % (FLUSH) 0.9 %
10 SYRINGE (ML) INJECTION AS NEEDED
Status: DISCONTINUED | OUTPATIENT
Start: 2024-06-27 | End: 2024-06-27 | Stop reason: HOSPADM

## 2024-06-27 RX ORDER — NALOXONE HYDROCHLORIDE 4 MG/.1ML
SPRAY NASAL
Qty: 2 EACH | Refills: 0 | Status: SHIPPED | OUTPATIENT
Start: 2024-06-27

## 2024-06-27 RX ORDER — ACETAMINOPHEN 500 MG
1000 TABLET ORAL 3 TIMES DAILY PRN
Qty: 42 TABLET | Refills: 0 | Status: SHIPPED | OUTPATIENT
Start: 2024-06-27 | End: 2024-07-11

## 2024-06-27 RX ORDER — IBUPROFEN 600 MG/1
600 TABLET ORAL EVERY 6 HOURS PRN
Qty: 28 TABLET | Refills: 0 | Status: SHIPPED | OUTPATIENT
Start: 2024-06-27 | End: 2024-07-04

## 2024-06-27 RX ORDER — OXYCODONE HYDROCHLORIDE 5 MG/1
5 TABLET ORAL EVERY 6 HOURS PRN
Qty: 20 TABLET | Refills: 0 | Status: SHIPPED | OUTPATIENT
Start: 2024-06-27 | End: 2024-07-02

## 2024-06-27 RX ORDER — SODIUM CHLORIDE 0.9 % (FLUSH) 0.9 %
10 SYRINGE (ML) INJECTION EVERY 12 HOURS SCHEDULED
Status: DISCONTINUED | OUTPATIENT
Start: 2024-06-27 | End: 2024-06-27 | Stop reason: HOSPADM

## 2024-06-27 RX ADMIN — KETOROLAC TROMETHAMINE 15 MG: 15 INJECTION, SOLUTION INTRAMUSCULAR; INTRAVENOUS at 06:00

## 2024-06-27 RX ADMIN — OXYCODONE AND ACETAMINOPHEN 1 TABLET: 7.5; 325 TABLET ORAL at 01:18

## 2024-06-27 RX ADMIN — KETOROLAC TROMETHAMINE 15 MG: 15 INJECTION, SOLUTION INTRAMUSCULAR; INTRAVENOUS at 12:06

## 2024-06-27 RX ADMIN — OXYCODONE AND ACETAMINOPHEN 1 TABLET: 7.5; 325 TABLET ORAL at 12:07

## 2024-06-27 RX ADMIN — OXYCODONE AND ACETAMINOPHEN 1 TABLET: 7.5; 325 TABLET ORAL at 05:58

## 2024-06-27 RX ADMIN — VANCOMYCIN HYDROCHLORIDE 1000 MG: 1 INJECTION, POWDER, LYOPHILIZED, FOR SOLUTION INTRAVENOUS at 11:43

## 2024-06-27 RX ADMIN — METOPROLOL SUCCINATE 50 MG: 50 TABLET, FILM COATED, EXTENDED RELEASE ORAL at 08:26

## 2024-06-27 NOTE — DISCHARGE SUMMARY
"    Patient Name: Onel Campbell  : 1950  MRN: [id number]    Date of Admission: 2024  Date of Discharge:  2024  Primary Care Physician: Asif Patricia MD      Chief Complaint:   Back Pain      Discharge Diagnoses     Active Hospital Problems    Diagnosis  POA    **Discitis of thoracic region [M46.44]  Yes    Opioid dependence [F11.20]  Yes    Subacute osteomyelitis of left ankle [M86.272]  Yes    Discitis [M46.40]  Yes    Obstructive sleep apnea of adult [G47.33]  Yes    Type 2 diabetes mellitus with other specified complication, without long-term current use of insulin [E11.69]  Yes    History of MRSA infection [Z86.14]  Yes    Peripheral vascular disease [I73.9]  Yes    Essential hypertension [I10]  Yes    Gastroesophageal reflux disease [K21.9]  Yes      Resolved Hospital Problems   No resolved problems to display.        Hospital Course     Mr. Campbell is a 74 y.o. male with a history of diabetes type 2, history of substance use disorder in remission on Suboxone, recent MRSA bacteremia presenting with back pain found to have T3-T4 discitis with small abscess and recurrent MRSA bacteremia.  Previously treated at [facility] with periods of doxycycline and Bactrim.  Follows with podiatry at [facility], Dr. Seth.  Neurosurgery was consulted no surgical intervention needed.  Plan to follow-up as an outpatient.  Repeat blood cultures have been negative.  ID was consulted and plan for IV vancomycin till . Will have weekly CBC with differential, creatinine, vancomycin level faxed to [phone] until he follows up with a 550 clinic.    Discussed risk and benefits of patient with having a PICC line with his history of substance abuse in remission for 10 years.  He follows with substance use physician at [facility] and has been on Suboxone.  Patient was very honest and states that he was \"a pill head\" and never used any IV medications.  States that his wife is in charge of all of his medications and " scheduled medications.  Plan to go home on a few days of oxycodone that his wife will be managing and giving him when the time is appropriate.  Narcan was prescribed at discharge.  Unfortunately, he has failed outpatient oral antibiotics at this point and IV antibiotics are really his only option to prevent further episodes of MRSA bacteremia and seeding of infection.  Patient had good response to Toradol and discussed using high-dose Tylenol and ibuprofen for the next 7 days and by that point the inflammation in his back should be improving with his IV antibiotics.    Dr. Seth reached out to our podiatrist on-call Dr. Swift.  CT left lower extremity showing some ankle osteomyelitis and infected hardware.  Discussed risk and benefits of leaving the hospital and having his primary podiatry manage his condition versus having it done while he is hospitalized.  Patient and wife are very eager to leave the hospital and they have appointment with Dr. Seth tomorrow in his clinic.  They also already have an appointment at [facility] infectious disease clinic on July 3.  Discussed with infectious disease and podiatry and they are okay with discharge with the above plan.    PICC line was placed and home health was ordered to help with antibiotic infusions.    At the time of discharge patient was told to take all medications as prescribed, keep all follow-up appointments, and call their doctor or return to the hospital with any worsening or concerning symptoms.    Day of Discharge     Subjective:  Patient resting comfortably bed, had good response to IV Toradol.  No nausea or vomiting.  No fever or chills.  Eager to leave the hospital today.        Physical Exam:  Temp:  [97.1 °F (36.2 °C)-98.9 °F (37.2 °C)] 97.4 °F (36.3 °C)  Heart Rate:  [] 92  Resp:  [16-17] 17  BP: (116-132)/(64-83) 116/64  Body mass index is 24.97 kg/m².  Physical Exam  Vitals and nursing note reviewed.   Constitutional:       General: He is not in  acute distress.  Cardiovascular:      Rate and Rhythm: Normal rate and regular rhythm.   Pulmonary:      Effort: Pulmonary effort is normal. No respiratory distress.   Abdominal:      General: Abdomen is flat. There is no distension.      Tenderness: There is no abdominal tenderness.   Musculoskeletal:         General: No swelling or deformity.   Skin:     General: Skin is warm and dry.   Neurological:      General: No focal deficit present.      Mental Status: He is alert. Mental status is at baseline.      Sensory: No sensory deficit.      Motor: No weakness.   Consultants     Consult Orders (all) (From admission, onward)       Start     Ordered    06/27/24 1003  Inpatient IV Team Consult PICC 1 Lumen  Once        Provider:  (Not yet assigned)    06/27/24 1003    06/26/24 1734  IV TEAM - Consult for Midline Placement (IV Team to Determine Number of Lumens)  Once        Provider:  (Not yet assigned)    06/26/24 1735    06/25/24 0851  Inpatient IV Team Consult Midline 1 Lumen  Once,   Status:  Canceled        Provider:  (Not yet assigned)    06/25/24 0851    06/24/24 1510  Inpatient Podiatry Consult  Once        Specialty:  Podiatry  Provider:  Ranjith Swift DPM    06/24/24 1510    06/23/24 0845  Inpatient Cardiology Consult  Once        Specialty:  Cardiology  Provider:  Maricruz Song MD    06/23/24 0844    06/21/24 1306  Inpatient Infectious Diseases Consult  Once        Specialty:  Infectious Diseases  Provider:  Tristen Brown MD    06/21/24 1307    06/21/24 1306  Inpatient Neurosurgery Consult  Once        Specialty:  Neurosurgery  Provider:  Samuel Jasso MD    06/21/24 1307    06/21/24 1008  [facility] (on-call MD unless specified) Details  Once,   Status:  Canceled        Specialty:  Hospitalist  Provider:  (Not yet assigned)    06/21/24 1007                  Procedures     Imaging Results (All)       Procedure Component Value Units Date/Time    CT Lower Extremity Left With Contrast [[id number]]  Collected: 06/26/24 2044     Updated: 06/26/24 2058    Narrative:      CT LOWER EXTREMITY LEFT W CONTRAST-     INDICATIONS: Hardware infection. Possible osteomyelitis of the foot and  ankle. Radiation dose reduction techniques were utilized, including  automated exposure control and exposure modulation based on body size.     TECHNIQUE: ENHANCED CT OF THE left foot and ankle     COMPARISON: None available     FINDINGS:     Surgical screws are seen at the talus, calcaneus, cuboid. Bone loss and  the talus, around the talocalcaneal screw, for example sagittal image  30, suggest loosening or infection, with infection favored, as erosions  are apparent on both sides of the talocalcaneal articulation on sagittal  image 34, compatible with osteomyelitis.      Appearance of small erosion is also noted at the talonavicular  articulation, for example sagittal image 24, could be an additional area  of osteomyelitis. In the appropriate clinical setting, neuropathic joint  could also contribute to this appearance, correlate clinically.     No acute fractures are identified. Osteoarthritic degenerative changes  are conspicuous at the midfoot. Moderate calcaneal spurring is present.     No yesenia dislocation is noted, but the talus is anteriorly subluxed in  relation to the tibia.     Mild to moderate ankle effusion is apparent, possibility of septic  arthritis at the tibiotalar articulation is not excluded.       Impression:         Evidence of osteomyelitis at the at the posterior talocalcaneal  articulation, with evidence of hardware loosening or infection in the  talus, involving the talocalcaneal surgical screw. Appearance of small  erosion at the talonavicular articulation, could be an additional area  of osteomyelitis.        Anterior subluxation of the talus in relation of the tibia. Fluid  accumulation around the tibiotalar articulation, potentially evidence of  septic arthritis.     Discussed by telephone with patient's  nurse, Gail, at time of  interpretation, 2050, 6/26/2028.              This report was finalized on 6/26/2024 8:55 PM by Dr. Thomas Varela M.D on Workstation: [id number]       XR Foot 3+ View Left [[id number]] Collected: 06/24/24 2211     Updated: 06/24/24 2218    Narrative:      2 VIEWS LEFT ANKLE; 3 VIEWS LEFT FOOT     HISTORY: Hardware infection     COMPARISON: None available.     FINDINGS:  Left ankle: Unfortunately, this patient's prior imaging is not available  for parison. No definite acute fracture or subluxation of the left ankle  is seen. There is diffuse soft tissue swelling noted about the ankle.     Left foot: Again, unfortunately, patient's prior imaging is unavailable.  There are extensive postsurgical changes involving the hindfoot. I don't  see any definite perihardware lucency to suggest loosening. There is no  hardware fracture. Diffuse soft tissue swelling of the left foot is  noted, as are vascular calcifications.       Impression:      Exam is difficult to interpret in the absence of prior studies for  comparison. No obvious acute fracture or subluxation is seen. There are  extensive postsurgical changes of the hindfoot.     This report was finalized on 6/24/2024 10:15 PM by Dr. Louisa Carter M.D on Workstation: [id number]       XR Ankle 2 View Left [[id number]] Collected: 06/24/24 2211     Updated: 06/24/24 2218    Narrative:      2 VIEWS LEFT ANKLE; 3 VIEWS LEFT FOOT     HISTORY: Hardware infection     COMPARISON: None available.     FINDINGS:  Left ankle: Unfortunately, this patient's prior imaging is not available  for parison. No definite acute fracture or subluxation of the left ankle  is seen. There is diffuse soft tissue swelling noted about the ankle.     Left foot: Again, unfortunately, patient's prior imaging is unavailable.  There are extensive postsurgical changes involving the hindfoot. I don't  see any definite perihardware lucency to suggest loosening. There is  no  hardware fracture. Diffuse soft tissue swelling of the left foot is  noted, as are vascular calcifications.       Impression:      Exam is difficult to interpret in the absence of prior studies for  comparison. No obvious acute fracture or subluxation is seen. There are  extensive postsurgical changes of the hindfoot.     This report was finalized on 6/24/2024 10:15 PM by Dr. Louisa Carter M.D on Workstation: [id number]       MRI Thoracic Spine With & Without Contrast [[id number]] Collected: 06/23/24 0354     Updated: 06/23/24 0420    Narrative:      THORACIC SPINE MRI WITHOUT AND WITH GADOLINIUM     HISTORY: rule out osteomylitis; M46.44-Discitis, unspecified, thoracic  region     COMPARISON: June 21, 2024.     FINDINGS:  Multiplanar images of the thoracic spine obtained without and  with gadolinium.  Axial images obtained from T1-S1.     There is abnormal T1 hypointense/T2 hyperintense signal identified  within the T3 and T4 vertebral bodies, along with some fluid within the  intervertebral disc space, concerning for osteomyelitis/discitis.  Following contrast administration, there is avid enhancement within the  vertebral bodies, and paraspinous tissues on the right. No additional  areas of osteomyelitis/discitis are seen. No acute fracture or  subluxation is seen. Cord signal appears normal. I do think there is an  epidural abscess/phlegmon anteriorly on the right at T3-T4, which  measures up to 1.9 cm in craniocaudal dimensions, and up to 3 mm in AP  dimensions. This is resulting in some moderate canal narrowing on the  right, and some narrowing of the right neural foramen. Patient also  appears to have some disc bulge at T7-T8, which results in effacement of  the thecal sac anteriorly. There is also some mild neuroforaminal  narrowing on the left. Further disc bulges noted at T10-T11, again, with  some effacement of the anterior aspect of the thecal sac.       Impression:      1.  Osteomyelitis/discitis noted at T3-T4. There is extensive  inflammation within the paraspinous soft tissues, favored to represent  phlegmon. The patient also appears to have a small abscess within the  right anterior epidural space at T3-T4, which measures approximately 3  mm in AP dimensions and centimeters in craniocaudal dimensions. This is  resulting in moderate canal narrowing on the right at this level. There  is also some involvement of the right neural foramen.     This report was finalized on 6/23/2024 4:16 AM by Dr. Louisa Carter M.D on Workstation: [id number]       CT Angiogram Chest [[id number]] Collected: 06/21/24 1003     Updated: 06/21/24 1249    Narrative:      CT ANGIOGRAM OF THE CHEST. MULTIPLE CORONAL, SAGITTAL, AND 3-D  RECONSTRUCTIONS.     HISTORY: Left upper back pain. Chest pain.     TECHNIQUE: Radiation dose reduction techniques were utilized, including  automated exposure control and exposure modulation based on body size.   CT angiogram of the chest was performed following the administration of  IV contrast. Coronal, sagittal, and 3-D reconstruction images were  obtained.      COMPARISON: None     FINDINGS:  There are multifocal coronary arterial calcifications.  There is no intrapulmonary arterial filling defect to diagnose a  pulmonary embolus. No mediastinal or hilar or axillary leticia enlargement  is demonstrated. There is a calcified nodule in the right lower lobe. No  suspicious pulmonary nodule or pleural effusion or infiltrate.  Multilevel bridging endplate osteophyte formation is present in the  thoracic spine. Old healed proximal sternal body fracture. Imaging  through the upper abdomen appears within normal limits.     There is abnormal soft tissue density and thickening surrounding the  T3-T4 disc space. There are areas of endplate cortical loss at T3-T4  with disc space narrowing and this combination of findings is suspicious  for a disc space infection. There is also mild  diminished height of the  T3 and T4 vertebral bodies with depression of the inferior plate of T3  and superior endplate of T4.       Impression:      1. Findings suspicious for disc space infection at T3-T4 where there is  disc space narrowing, endplate cortical loss with mild diminished  vertebral body height and surrounding soft tissue thickening. Recommend  further evaluation with MRI of the thoracic spine with and without  contrast if patient is able to obtain MRI.  2. No evidence for pulmonary thromboembolic disease or acute abnormality  in the chest.  3. Extensive coronary arterial calcifications.      Discussed with Dr. Dietrich in the emergency department on 06/21/2024  at 9:55 a.m.     This report was finalized on 6/21/2024 12:46 PM by Dr. Prince Banuelos M.D on Workstation: [id number]       XR Spine Thoracic 3 View [[id number]] Collected: 06/21/24 0847     Updated: 06/21/24 0857    Narrative:      XR SPINE THORACIC 3 VW-6/21/2024     HISTORY: Back pain.     There is normal alignment. Hypertrophic changes are seen in the thoracic  vertebrae. No thoracic fractures are seen. No subluxation is seen.       Impression:      1. Thoracic degenerative changes.  2. No acute bony abnormality is seen.        This report was finalized on 6/21/2024 8:54 AM by Dr. Shade Ivy M.D on Workstation: [id number]       XR Chest 2 View [[id number]] Collected: 06/21/24 0845     Updated: 06/21/24 0848    Narrative:      XR CHEST 2 VW-6/21/2024     HISTORY: Back pain.     Heart size is within normal limits. Lungs are slightly underinflated but  appear clear. There are degenerative changes in the spine.       Impression:      1. No acute process.        This report was finalized on 6/21/2024 8:45 AM by Dr. Shade Ivy M.D on Workstation: [id number]               Pertinent Labs     Results from last 7 days   Lab Units 06/26/24  0429 06/25/24  0335 06/24/24  0344 06/23/24  0608   WBC 10*3/mm3 5.25 6.01 5.18 5.44  "  HEMOGLOBIN g/dL 10.7* 9.8* 9.6* 9.6*   PLATELETS 10*3/mm3 222 293 199 223     Results from last 7 days   Lab Units 06/26/24  0429 06/25/24  0335 06/24/24  0344 06/23/24  0608   SODIUM mmol/L 138 137 136 139   POTASSIUM mmol/L 4.4 4.5 4.2 4.3   CHLORIDE mmol/L 104 104 102 104   CO2 mmol/L 26.5 26.0 27.0 28.0   BUN mg/dL 12 15 13 13   CREATININE mg/dL 0.86 0.88 0.89 0.86   GLUCOSE mg/dL 108* 121* 108* 114*   Estimated Creatinine Clearance: 85.2 mL/min (by C-G formula based on SCr of 0.86 mg/dL).  Results from last 7 days   Lab Units 06/21/24  0800   ALBUMIN g/dL 3.6   BILIRUBIN mg/dL 0.8   ALK PHOS U/L 226*   AST (SGOT) U/L 18   ALT (SGPT) U/L 11     Results from last 7 days   Lab Units 06/26/24  0429 06/25/24  0335 06/24/24  0344 06/23/24  0608 06/22/24  0248 06/21/24  0800   CALCIUM mg/dL 9.7 9.2 9.0 9.1   < > 9.5   ALBUMIN g/dL  --   --   --   --   --  3.6    < > = values in this interval not displayed.       Results from last 7 days   Lab Units 06/21/24  0800   D DIMER QUANT MCGFEU/mL 2.97*           Invalid input(s): \"LDLCALC\"  Results from last 7 days   Lab Units 06/23/24  0607 06/23/24  0606 06/21/24  1117 06/21/24  1107   BLOODCX  No growth at 4 days No growth at 4 days Staphylococcus aureus, MRSA* Staphylococcus aureus, MRSA*   BCIDPCR   --   --   --  Staph aureus. mecA/C and MREJ (methicillin resistance gene) detected. Identification by BCID2 PCR.*       Test Results Pending at Discharge     Pending Labs       Order Current Status    Blood Culture - Blood, Arm, Left Preliminary result    Blood Culture - Blood, Arm, Right Preliminary result            Discharge Details        Discharge Medications        New Medications        Instructions Start Date   Acetaminophen Extra Strength 500 MG tablet  Commonly known as: TYLENOL   1,000 mg, Oral, 3 Times Daily PRN      ibuprofen 600 MG tablet  Commonly known as: ADVIL,MOTRIN   600 mg, Oral, Every 6 Hours PRN      naloxone 4 MG/0.1ML nasal spray  Commonly known as: " NARCAN   Call 911. Don't prime. [place] in 1 nostril for overdose. Repeat in 2-3 minutes in other nostril if no or minimal breathing/responsiveness.      oxyCODONE 5 MG immediate release tablet  Commonly known as: Roxicodone   5 mg, Oral, Every 6 Hours PRN      vancomycin 1,000 mg in sodium chloride 0.9 % 250 mL IVPB   1,000 mg, Intravenous, Every 12 Hours             Changes to Medications        Instructions Start Date   sulfamethoxazole-trimethoprim 800-160 MG per tablet  Commonly known as: BACTRIM DS,SEPTRA DS  What changed: when to take this   Take 1 tablet by mouth every 12 (twelve) hours.             Continue These Medications        Instructions Start Date   bisacodyl 10 MG suppository  Commonly known as: DULCOLAX   10 mg, Rectal, Daily      gabapentin 300 MG capsule  Commonly known as: NEURONTIN   300 mg, Oral, Nightly      Jardiance 10 MG tablet tablet  Generic drug: empagliflozin   10 mg, Oral, Daily      metoprolol succinate XL 50 MG 24 hr tablet  Commonly known as: TOPROL-XL   50 mg, Oral, Daily, Do not crush or chew.      Mounjaro 15 MG/0.5ML solution pen-injector pen  Generic drug: Tirzepatide   15 mg, Subcutaneous, Weekly      Mounjaro 12.5 MG/0.5ML solution pen-injector pen  Generic drug: Tirzepatide   12.5 mg, Subcutaneous, Weekly             Stop These Medications      carvedilol 12.5 MG tablet  Commonly known as: COREG              No Known Allergies      Discharge Disposition:  Home-Health Care [facility]    Discharge Diet:  Diet Order   Procedures    Diet: Cardiac, Diabetic; Healthy Heart (2-3 Na+); Consistent Carbohydrate; Fluid Consistency: Thin (IDDSI 0)       Discharge Activity:   As tolerated    CODE STATUS:    Code Status and Medical Interventions:   Ordered at: 06/21/24 1041     Code Status (Patient has no pulse and is not breathing):    CPR (Attempt to Resuscitate)     Medical Interventions (Patient has pulse or is breathing):    Full Support       No future appointments.   Contact information  for follow-up providers       Asif Patricia MD .    Specialty: Internal Medicine  Contact information:  [address], [address]  [facility] [address]  [phone]                       Contact information for after-discharge care       Dialysis/Infusion       [facility] .    Service: Infusion and IV Therapy  Contact information:  [address]  [facility] [address] [phone]                     Home Medical Care       [facility] .    Service: Home Nursing  Contact information:  [address], [address]  [place] [address] [phone]                                   Time Spent on Discharge:  Greater than 30 minutes      Yomi Goldsmith MD  [facility]  06/27/24  12:01 EDT

## 2024-06-27 NOTE — PLAN OF CARE
Goal Outcome Evaluation:  Plan of Care Reviewed With: patient        Progress: improving  Outcome Evaluation: VSS, RA, SL, up ad tamar, pain controlled with PO meds tonight, toradol given once, IV ABX given, podiatry to see this AM, plans for home hopefully today when midline placed

## 2024-06-27 NOTE — PROGRESS NOTES
Continued Stay Note  [facility]     Patient Name: Onel Campbell  MRN: [id number]  Today's Date: 6/27/2024    Admit Date: 6/21/2024    Plan: Home with family support, [facility].   Discharge Plan       Row Name 06/27/24 1640       Plan    Final Discharge Disposition Code 06 - home with home health care    Final Note [facility]                   Discharge Codes    No documentation.                 Expected Discharge Date and Time       Expected Discharge Date Expected Discharge Time    Jun 27, 2024               Anneliese Gusman RN     unchanged unchanged unchanged unchanged

## 2024-06-27 NOTE — PLAN OF CARE
Goal Outcome Evaluation:   Patient is alert x4. Vitals are stable.  Dressing was changed. Assist x1 with ambulation.  Percocet and toradol were ordered for pain management.  Single lumen PICC placed to RUE.  PICC flushes well with good blood return. Vancomycin was ordered for antibiotic therapy. Patient to remain on antibiotics for 6 weeks. Patient in contact isolation for MRSA. Patient to discharge to home today.

## 2024-06-27 NOTE — PROGRESS NOTES
Podiatry Progress Note      Patient: Onel Campbell Admit Date: 06/21/2024    Age: 74 y.o.   PCP: Asif Patricia MD    MRN: [id number]  Room: [facility]        Subjective     Chief Complaint     Chief Complaint   Patient presents with    Back Pain        HPI     Patient resting in bed, wife at bedside. CT LE scan took some time to read by radiology. Patient is hopeful to go home.    Past Medical History     Past Medical History:   Diagnosis Date    Diabetes     GERD (gastroesophageal reflux disease)     Hypertension     Prostate cancer     Renal disease     Sleep apnea         History reviewed. No pertinent surgical history.     No Known Allergies     Social History     Tobacco Use   Smoking Status Former    Types: Cigarettes   Smokeless Tobacco Not on file        Objective   Physical Exam    Vitals:    06/27/24 0637   BP: 116/64   Pulse: 92   Resp: 17   Temp: 97.4 °F (36.3 °C)   SpO2: 96%        Dermatology: Several incisions of the foot and ankle.  No open lesions noted.  Increased warmth to the left ankle compared to the right ankle.      Labs     Lab Results   Component Value Date    POCGLU 116 06/23/2024    SEDRATE 52 (H) 06/21/2024        CBC:      Lab 06/26/24  0429 06/25/24  0335 06/24/24  0344 06/23/24  0608 06/22/24  0248   WBC 5.25 6.01 5.18 5.44 4.99   HEMOGLOBIN 10.7* 9.8* 9.6* 9.6* 9.6*   HEMATOCRIT 33.0* 29.7* 29.8* 29.7* 29.4*   PLATELETS 222 293 199 223 188   NEUTROS ABS 3.15 3.56 3.16 3.53 3.44   IMMATURE GRANS (ABS) 0.03 0.05 0.01 0.02 0.01   LYMPHS ABS 1.37 1.55 1.23 1.17 0.87   MONOS ABS 0.36 0.43 0.45 0.44 0.36   EOS ABS 0.31 0.36 0.29 0.25 0.27   MCV 86.2 86.3 86.6 85.8 86.5          Results for orders placed or performed during the hospital encounter of 06/21/24   Blood Culture - Blood, Arm, Right    Specimen: Arm, Right; Blood   Result Value Ref Range    Blood Culture No growth at 4 days         CT Lower Extremity Left With Contrast  Narrative: CT LOWER EXTREMITY LEFT W CONTRAST-      INDICATIONS: Hardware infection. Possible osteomyelitis of the foot and  ankle. Radiation dose reduction techniques were utilized, including  automated exposure control and exposure modulation based on body size.     TECHNIQUE: ENHANCED CT OF THE left foot and ankle     COMPARISON: None available     FINDINGS:     Surgical screws are seen at the talus, calcaneus, cuboid. Bone loss and  the talus, around the talocalcaneal screw, for example sagittal image  30, suggest loosening or infection, with infection favored, as erosions  are apparent on both sides of the talocalcaneal articulation on sagittal  image 34, compatible with osteomyelitis.      Appearance of small erosion is also noted at the talonavicular  articulation, for example sagittal image 24, could be an additional area  of osteomyelitis. In the appropriate clinical setting, neuropathic joint  could also contribute to this appearance, correlate clinically.     No acute fractures are identified. Osteoarthritic degenerative changes  are conspicuous at the midfoot. Moderate calcaneal spurring is present.     No yesenia dislocation is noted, but the talus is anteriorly subluxed in  relation to the tibia.     Mild to moderate ankle effusion is apparent, possibility of septic  arthritis at the tibiotalar articulation is not excluded.     Impression:    Evidence of osteomyelitis at the at the posterior talocalcaneal  articulation, with evidence of hardware loosening or infection in the  talus, involving the talocalcaneal surgical screw. Appearance of small  erosion at the talonavicular articulation, could be an additional area  of osteomyelitis.        Anterior subluxation of the talus in relation of the tibia. Fluid  accumulation around the tibiotalar articulation, potentially evidence of  septic arthritis.     Discussed by telephone with patient's nurse, Gail, at time of  interpretation, 2049, 6/26/2028.              This report was finalized on 6/26/2024 8:55  PM by Dr. Thomas Varela M.D on Workstation: [id number]          Assessment/Plan     73yo M with left foot osteomyelitis of STJ, TNJ with hardware loosening    -Pt examined and evaluated by myself  -Reviewed CT results with patient and wife. As we initially thought, there is osteomyelitis in the STJ and TNJ with loosened hardware. We discussed patient will need  HWR, debridement, biopsy of the left hindfoot. I can perform procedure today, but patient and wife are hopeful for d/c and have a scheduled appt with Dr. Seth tomorrow.   -I spoke with Dr. Seth on the phone, he is planning to see the patient in outpatient clinic tomorrow and get scheduled for urgent outpatient procedure next week.   -Patient will need midline placed and receive abx. I discussed the risks and complications that can be associated with delaying surgical care for osteomyelitis, they understand. I also discussed that revisional surgery and bone infection will extend recovery by 3-6 months. Again, Dr. Seth has discussed the same with them and they understand.   -OK for d/c from podiatry standpoint    Please call with any questions/concerns    Ranjith Swift DPM  Office: [phone]

## 2024-06-27 NOTE — SIGNIFICANT NOTE
"   06/27/24 1418   PICC Single Lumen 06/27/24 Right Brachial   Placement date: If unknown, DO NOT use \"Add Comment\" note/Placement time: If unknown, DO NOT use \"Add Comment\" note: 06/27/24 1417   Hand Hygiene Completed: Yes  Size (Fr): 4  Description (optional): power picc lot no [id number], exp 07-  Length ...   Site Assessment Clean;Dry;Intact   #1 Lumen Status Blood return noted;Capped;Flushed;Normal saline locked   Length alphonse (cm) 34 cm   Line Care Connections checked and tightened   Extremity Circumference (cm) 28 cm   Dressing Type Border Dressing;Securing device;Antimicrobial dressing/disc   Dressing Status Clean;Dry;Intact   Dressing Intervention New dressing   Dressing Change Due 07/04/24   Indication/Daily Review of Necessity long-term IV access >7 days        Needles, 2 guidewires, and 1 scalpel accounted for and disposed of.          "

## 2024-06-27 NOTE — PLAN OF CARE
"Goal Outcome Evaluation:  Plan of Care Reviewed With: patient        Progress: improving  Outcome Evaluation: Patient stable during shift, VSS and voiding function is intact. Pain managed with prn percocet and dilaudid, educated patient on weaning from IV dilaudid in preparation for d/c tomorrow and instead using toradol in place of dilaudid for breakthrough pain. Patient agreed to try toradol tonight in attempt to wean from dilaudid. Podiatry to see patient in am \"prior to clinic\" per Dr. Goldsmith after reaching out to Podiatry. IV vanc continued. IV team consulted for access, currently ordered for midline. Patient ambulating ad tamar. Hopeful for d/c tomorrow.                               "

## 2024-06-28 LAB
BACTERIA SPEC AEROBE CULT: NORMAL
BACTERIA SPEC AEROBE CULT: NORMAL

## 2024-06-28 NOTE — OUTREACH NOTE
Prep Survey      Flowsheet Row Responses   [facility] patient discharged from? [place]   Is LACE score < 7 ? No   Eligibility Readm Mgmt   Discharge diagnosis Discitis of thoracic region   Does the patient have one of the following disease processes/diagnoses(primary or secondary)? Other   Does the patient have Home health ordered? Yes   What is the Home health agency?  [facility]-   Is there a DME ordered? No   Prep survey completed? Yes            JELENA BURTON - Registered Nurse

## 2024-07-01 ENCOUNTER — TELEPHONE (OUTPATIENT)
Dept: INFECTIOUS DISEASES | Facility: CLINIC | Age: 74
End: 2024-07-01
Payer: MEDICARE

## 2024-07-01 NOTE — TELEPHONE ENCOUNTER
----- Message from Brianne ROUSSEAU sent at 7/1/2024  8:05 AM EDT -----  Regarding: patient call to [facility] on 6/28  Patient called [facility] and left the following message. Please advise     We were sent home Thursday with an infusion for Vancomycin. We have no tubing. We don't have enough tubing for the Vancomycin. He's doing another treatment now.

## 2024-07-01 NOTE — TELEPHONE ENCOUNTER
I contacted Ana Maria NIXON/pharmacist at [facility] to inform of patient's wife's message that they needed more IV tubing. She stated that she or someone in pharmacy will contact patient/wife to provide more tubing and to verify the frequency they are changing the tubing.   I then tried to reach both patient & wife but no answer and so I left voice message that I had contacted [facility] about their need for more tubing and I also left the pharmacy's phone number for them to call if needed.

## 2024-07-02 ENCOUNTER — READMISSION MANAGEMENT (OUTPATIENT)
Dept: CALL CENTER | Facility: HOSPITAL | Age: 74
End: 2024-07-02
Payer: MEDICARE

## 2024-07-02 NOTE — OUTREACH NOTE
Medical Week 1 Survey      Flowsheet Row Responses   [facility] patient discharged from? [place]   Does the patient have one of the following disease processes/diagnoses(primary or secondary)? Other   Week 1 attempt successful? No   Unsuccessful attempts Attempt 1            Katharine BELTRAN - Registered Nurse

## 2024-07-03 ENCOUNTER — TELEPHONE (OUTPATIENT)
Dept: INFECTIOUS DISEASES | Facility: CLINIC | Age: 74
End: 2024-07-03
Payer: MEDICARE

## 2024-07-03 NOTE — TELEPHONE ENCOUNTER
CARLOI: I called [facility] today, spoke to Svetlana, to see if they could send me the lab results of the pateint's labs from this week. She states they are not going to start labs until 7/8/24 since ID at [address] [place] [address] gave them these orders. I wanted to check with Dr. Ramirez to see if we need to continue following patient since he has already transferred care back to [facility]. Please advise. DAVID, RN

## 2024-07-09 ENCOUNTER — READMISSION MANAGEMENT (OUTPATIENT)
Dept: CALL CENTER | Facility: HOSPITAL | Age: 74
End: 2024-07-09
Payer: MEDICARE

## 2024-07-09 NOTE — OUTREACH NOTE
Medical Week 2 Survey      Flowsheet Row Responses   [facility] patient discharged from? [place]   Does the patient have one of the following disease processes/diagnoses(primary or secondary)? Other   Week 2 attempt successful? Yes   Call start time 1509   Discharge diagnosis Discitis of thoracic region   Call end time 1511   Medication alerts for this patient IV Vancomycin   Meds reviewed with patient/caregiver? Yes   Is the patient having any side effects they believe may be caused by any medication additions or changes? No   Does the patient have all medications ordered at discharge? Yes   Is the patient taking all medications as directed (includes completed medication regime)? Yes   Does the patient have a primary care provider?  Yes   Has the patient kept scheduled appointments due by today? Yes  [Pt followed up with Dorinda today 7/9/24]   What is the Home health agency?  [facility]-   Has home health visited the patient within 72 hours of discharge? Yes   Psychosocial issues? No   Did the patient receive a copy of their discharge instructions? Yes   Nursing interventions Reviewed instructions with patient   What is the patient's perception of their health status since discharge? Improving  [Pt reports he is doing well, pain is managed and completed f/u with Dr. Seth today. Aware to monitor for increase in s/s infection.  No questions today.]   Is the patient/caregiver able to teach back signs and symptoms related to disease process for when to call PCP? Yes   Is the patient/caregiver able to teach back signs and symptoms related to disease process for when to call 911? Yes   Week 2 Call Completed? Yes   Graduated Yes   Call end time 1511            MICHELINE FERNÁNDEZ - Registered Nurse

## 2024-07-10 ENCOUNTER — TELEPHONE (OUTPATIENT)
Dept: NEUROSURGERY | Facility: CLINIC | Age: 74
End: 2024-07-10
Payer: MEDICARE

## 2024-07-10 NOTE — TELEPHONE ENCOUNTER
Left message for patient regarding scheduling an 2-3 week follow up appointment with Dr Jasso. Patient is to call the office and ask for Eliza.

## 2025-04-18 ENCOUNTER — SPECIALTY PHARMACY (OUTPATIENT)
Dept: PHARMACY | Facility: TELEHEALTH | Age: 75
End: 2025-04-18
Payer: MEDICARE

## 2025-04-18 PROBLEM — L20.89 OTHER ATOPIC DERMATITIS: Status: ACTIVE | Noted: 2025-04-18

## 2025-04-18 NOTE — PROGRESS NOTES
Specialty Pharmacy Patient Management Program  Initial Assessment     Onel Campbell is a 75 y.o. male with atopic dermatitis and enrolled in the Patient Management program offered by [facility]. An initial outreach was conducted, including assessment of therapy appropriateness and specialty medication education for Dupixent. The patient was introduced to services offered by [facility], including: regular assessments, refill coordination, curbside pick-up or mail order delivery options, prior authorization maintenance, and financial assistance programs as applicable. The patient was also provided with contact information for the pharmacy team.     Insurance Coverage & Financial Support  Covered under [employer] insurance for no charge      Relevant Past Medical History and Comorbidities  Relevant medical history and concomitant health conditions were discussed with the patient. The patient's chart has been reviewed for relevant past medical history and comorbid health conditions and updated as necessary.   Past Medical History:   Diagnosis Date    Diabetes     GERD (gastroesophageal reflux disease)     Hypertension     Prostate cancer     Renal disease     Sleep apnea      Social History     Socioeconomic History    Marital status:    Tobacco Use    Smoking status: Former     Types: Cigarettes   Vaping Use    Vaping status: Never Used   Substance and Sexual Activity    Alcohol use: Not Currently    Drug use: Never    Sexual activity: Defer     Problem list reviewed by Chance Luis RPH on 4/18/2025 at  4:25 PM    Allergies  Known allergies and reactions were discussed with the patient. The patient's chart has been reviewed for allergy information and updated as necessary.   Patient has no known allergies.  Allergies reviewed by Chance Luis RPH on 4/18/2025 at  4:25 PM  Allergies reviewed by Chance Luis RPH on 4/18/2025 at  4:25 PM    Current  Medication List  This medication list has been reviewed with the patient and evaluated for any interactions or necessary modifications/recommendations, and updated to include all prescription medications, OTC medications, and supplements the patient is currently taking. This list reflects what is contained in the patient's profile, which has also been marked as reviewed to communicate to other providers it is the most up to date version of the patient's current medication therapy.     Current Outpatient Medications:     ALPRAZolam (XANAX) 0.5 MG tablet, Take 1 tablet by mouth 2 (Two) Times a Day if Neeeded for Anxiety. May take one hour prior to boarding plane and may take again during flight once if needed., Disp: 4 tablet, Rfl: 0    bisacodyl (DULCOLAX) 10 MG suppository, Insert 1 suppository into the rectum Daily., Disp: 10 suppository, Rfl: 0    bumetanide (BUMEX) 2 MG tablet, Take 1 tablet by mouth Daily., Disp: 90 tablet, Rfl: 3    bumetanide (BUMEX) 2 MG tablet, Take 1 tablet by mouth 2 (Two) Times a Day., Disp: 180 tablet, Rfl: 3    buprenorphine-naloxone (SUBOXONE) 8-2 MG per SL tablet, Place 1 tablet under the tongue 2 (Two) Times a Day., Disp: 30 tablet, Rfl: 0    buprenorphine-naloxone (SUBOXONE) 8-2 MG per SL tablet, Place 3 tablets under the tongue Daily., Disp: 90 tablet, Rfl: 0    buprenorphine-naloxone (SUBOXONE) 8-2 MG per SL tablet, Place 3 tablets (24 mg total) under the tongue 1 (one) time each day., Disp: 90 tablet, Rfl: 1    carvedilol (COREG) 12.5 MG tablet, Take 1 tablet by mouth 2 (Two) Times a Day With Meals., Disp: 180 tablet, Rfl: 2    carvedilol (COREG) 6.25 MG tablet, Take 1 tablet by mouth 2 (Two) Times a Day With Meals., Disp: 180 tablet, Rfl: 3    desonide (DESOWEN) 0.05 % lotion, Apply topically 3 (three) times a day., Disp: 118 mL, Rfl: 11    diazePAM (VALIUM) 5 MG tablet, Take 1-2 tablets by mouth 30 minutes before MRI, Disp: 2 tablet, Rfl: 0    doxycycline (VIBRAMYICN) 100 MG  tablet, Take 1 tablet by mouth Daily. Take with a full glass of water and do not lie down for at least 30 minutes after., Disp: 30 tablet, Rfl: 1    doxycycline (VIBRAMYICN) 100 MG tablet, Take 1 tablet (100 mg total) by mouth 1 (one) time each day. Take with a full glass of water and do not lie down for at least 30 minutes after., Disp: 30 tablet, Rfl: 0    doxycycline (VIBRAMYICN) 100 MG tablet, Take 1 tablet by mouth Daily. Take with a full glass of water and do not lie down for at least 30 minutes after., Disp: 30 tablet, Rfl: 2    Dupilumab (Dupixent) 300 MG/2ML solution prefilled syringe, Inject 2 mL under the skin into the appropriate area as directed Every 14 (Fourteen) Days., Disp: 4 mL, Rfl: 2    fluconazole (Diflucan) 150 MG tablet, Take 1 tablet by mouth as a one time dose, Disp: 1 tablet, Rfl: 1    fluocinolone (SYNALAR) 0.025 % cream, Apply topically 2 (two) times a day if needed for irritation or rash., Disp: 120 g, Rfl: 1    gabapentin (NEURONTIN) 300 MG capsule, Take 1 capsule by mouth Every Night., Disp: , Rfl:     gabapentin (NEURONTIN) 300 MG capsule, Take 1 capsule by mouth Every Night., Disp: 90 capsule, Rfl: 1    glucose blood (Accu-Chek Sharon Plus) test strip, Use as instructed up to three times a day, Disp: 200 each, Rfl: 0    HYDROcodone-acetaminophen (NORCO)  MG per tablet, Take 1 tablet by mouth Every 6 (Six) Hours As Needed for Severe Pain for up to 14 Days, Disp: 10 tablet, Rfl: 0    HYDROcodone-acetaminophen (NORCO)  MG per tablet, Take 1 tablet by mouth every 6 (six) hours if needed for severe pain for up to 14 days., Disp: 56 tablet, Rfl: 0    linezolid (ZYVOX) 600 MG tablet, Take 1 tablet (600 mg total) by mouth 2 (two) times a day for 15 days., Disp: 30 tablet, Rfl: 0    methylPREDNISolone (Medrol) 4 MG dose pack, Take as directed, Disp: 21 tablet, Rfl: 0    methylPREDNISolone (MEDROL) 4 MG dose pack, Follow schedule on package instructions, Disp: 21 tablet, Rfl: 0     metoprolol succinate XL (TOPROL-XL) 200 MG 24 hr tablet, Take 1 tablet by mouth Daily., Disp: 90 tablet, Rfl: 0    naloxone (NARCAN) 4 MG/0.1ML nasal spray, Call 911. Don't prime. Lucas in 1 nostril for overdose. Repeat in 2-3 minutes in other nostril if no or minimal breathing/responsiveness., Disp: 2 each, Rfl: 0    pregabalin (Lyrica) 100 MG capsule, Take 1 capsule by mouth 2 (Two) Times a Day., Disp: 60 capsule, Rfl: 0    pregabalin (LYRICA) 75 MG capsule, Take 1 capsule by mouth Daily., Disp: 30 capsule, Rfl: 0    tacrolimus (PROTOPIC) 0.1 % ointment, Apply  topically to the appropriate area as directed 2 (Two) Times a Day., Disp: 60 g, Rfl: 2    Testosterone 20.25 MG/ACT (1.62%) gel, Apply 2 Pumps (40.5mg) topically to the appropriate area as directed Daily., Disp: 75 g, Rfl: 0    Testosterone 20.25 MG/ACT (1.62%) gel, Place 2 Pumps (40.5 mg total) on the skin 1 (one) time each day., Disp: 75 g, Rfl: 1    Testosterone 30 MG/ACT solution, Apply 1 pump topically to each axilla as directed Daily., Disp: 270 mL, Rfl: 5    Tirzepatide (Mounjaro) 15 MG/0.5ML solution auto-injector, Inject 15 mg under the skin into the appropriate area as directed 1 (One) Time Per Week., Disp: 2 mL, Rfl: 11    Tirzepatide 15 MG/0.5ML solution auto-injector, Inject 0.5 mL under the skin into the appropriate area as directed 1 (One) Time Per Week., Disp: 6 mL, Rfl: 3    tiZANidine (ZANAFLEX) 4 MG tablet, Take 0.5 tablets (2 mg total) by mouth every 8 (eight) hours if needed for muscle spasms., Disp: 90 tablet, Rfl: 1    triamcinolone (KENALOG) 0.025 % cream, Apply to affected areas of eczema twice daily as needed for itching, Disp: 454 g, Rfl: 1    triamcinolone (KENALOG) 0.1 % cream, Apply topically 2 (two) times a day., Disp: 454 g, Rfl: 3    vilazodone (VIIBRYD) 40 MG tablet tablet, Take 1 tablet by mouth Daily With Breakfast., Disp: 30 tablet, Rfl: 1  Medicines reviewed by Chance Luis ISA on 4/18/2025 at  4:25 PM    Drug  Interactions  none     Relevant Laboratory Values  Lab Results   Component Value Date    GLUCOSE 108 (H) 06/26/2024    CALCIUM 9.7 06/26/2024     06/26/2024    K 4.4 06/26/2024    CO2 26.5 06/26/2024     06/26/2024    BUN 12 06/26/2024    CREATININE 0.86 06/26/2024    BCR 14.0 06/26/2024    ANIONGAP 7.5 06/26/2024     Lab Results   Component Value Date    WBC 5.25 06/26/2024    HGB 10.7 (L) 06/26/2024    HCT 33.0 (L) 06/26/2024    MCV 86.2 06/26/2024     06/26/2024    INR 1.0 04/12/2024     Lab Value Review  The above lab values have been reviewed; the following specialty medication(s) dose adjustment(s) are recommended: none.    Initial Education Provided for Specialty Medication  The patient has been provided with the following education and any applicable administration techniques (i.e. self-injection) have been demonstrated for the therapies indicated. All questions and concerns have been addressed prior to the patient receiving the medication, and the patient has verbalized understanding of the education and any materials provided. Additional patient education shall be provided and documented upon request by the patient, provider or payer.         Dupixent (dupilumab)         Medication Expectations   Why am I taking this medication? You are taking this medication for eosinophillic esophagitis, asthma, atopic dermatitis, or chronic rhinosinusitis with nasal polyps.   What should I expect while on this medication? You should expect a decrease in the frequency and severity of symptoms.   How does the medication work? Dupilumab is a monoclonal antibody that inhibits interleukin-4 and interleukin-13 binding. This decreases the release of proinflammatory cytokines, chemokines, nitric oxide, and lowers IgE levels in the blood.   How long will I be on this medication for? The amount of time you will be on this medication will be determined by your doctor and your response to the medication.    How  do I take this medication? Take as directed on your prescription label. Allow medication to reach room temperature for 30-45 minutes prior to injection. This medication is a subcutaneous injection given in the fatty part of the skin on the top of the thigh or stomach area. May be given in upper arm by provider or caregiver. Separate doses >/= 400 mg into two sites.   What are some possible side effects? Injection site reactions and hypersensitivity reactions, conjunctivitis, signs of a common cold, or gastritis.   What happens if I miss a dose? If you miss a dose, take it as soon as you remember. If it is close to the time for your next dose, skip the missed dose and go back to your normal time.             Medication Safety   What are things I should warn my doctor immediately about? Allergic reaction such has hives or trouble breathing. If you develop symptoms of infection such as a fever or cough that does not go away, chest pain, joint pain, dizziness, swollen glands, or numbness or tingling that is not normal.    What are things that I should be cautious of? Injection site reaction or conjunctivitis. You may have more chance of getting an infection.  Wash your hands often and stay away from people with infections, colds, or flu.   What are some medications that can interact with this one? Immunosuppressants and vaccines.            Medication Storage/Handling   How should I handle this medication? Keep this medication our of reach of pets/children in original container.  Store upright in the original container to protect from light. Do not freeze or shake. Do not inject into skin that is tender, damaged, bruised, or scarred.  Rotate injection sites.   How does this medication need to be stored? Store in refrigerator and keep dry. If needed, you may store at room temperature for up to 14 days.   How should I dispose of this medication? You can dispose of the empty syringe in a sharps container, and if this is  not available you may use an empty hard plastic container such as a milk jug or tide container. Discard any unused portion or if stored outside of refrigerator > 14 days.            Resources/Support   How can I remind myself to take this medication? You can download a reminder cande on your phone or use a calandar  to help with your injection.   Is financial support available?  Yes, [employer] can provide co-pay assistance if you have commercial insurance or patient assistance if you have Medicare or no insurance.    Which vaccines are recommended for me? Talk to your doctor about these vaccines: Flu, Coronavirus (COVID-19), Pneumococcal (pneumonia), Tdap, Hepatitis B, Zoster (shingles). Avoid use of live vaccines while on Dupixent                Adherence, Self-Administration, and Current Therapy Problems  Adherence related to the patient's specialty therapy was discussed with the patient. The Adherence segment of this outreach has been reviewed and updated.          Additional Barriers to Patient Self-Administration: none  Methods for Supporting Patient Self-Administration: none    Open Medication Therapy Problems  No medication therapy recommendations to display    Goals of Therapy   Goals Addressed Today        Specialty Pharmacy General Goal      A reduction in BSA of skin lesions on the body.                Reassessment Plan & Follow-Up  Medication Therapy Changes: had loading dose at the office on 4/17/25,  gave 1 sample dose for patient to take on 5/2/25. Sending first month out to patient as well. Will follow up in 3 weeks.  Additional Plans, Therapy Recommendations, or Therapy Problems to Be Addressed: none   Pharmacist to perform regular reassessments no more than (6) months from the previous assessment.  Welcome information and patient satisfaction survey to be sent by retail team with patient's initial fill.  Care Coordinator to set up future refill outreaches, coordinate prescription delivery, and  escalate clinical questions to pharmacist.     Attestation  I attest the patient was actively involved in and has agreed to the above plan of care. I attest that the initiated specialty medication(s) are appropriate for the patient based on my assessment. If the prescribed therapy is at any point deemed not appropriate based on the current or future assessments, a consultation will be initiated with the patient's specialty care provider to determine the best course of action. The revised plan of therapy will be documented along with any reassessments and/or additional patient education provided.     Electronically signed by Chance Luis RPH, 04/18/25, 4:25 PM EDT.

## 2025-05-14 ENCOUNTER — SPECIALTY PHARMACY (OUTPATIENT)
Dept: PHARMACY | Facility: TELEHEALTH | Age: 75
End: 2025-05-14
Payer: MEDICARE

## 2025-05-14 NOTE — PROGRESS NOTES
Specialty Pharmacy Patient Management Program  Refill Outreach     Onel was contacted today regarding refills of their medication(s).    Refill Questions      Flowsheet Row Most Recent Value   Changes to allergies? No   Changes to medications? No   New conditions or infections since last clinic visit No   Unplanned office visit, urgent care, ED, or hospital admission in the last 4 weeks  No   How does patient/caregiver feel medication is working? Very good   Financial problems or insurance changes  No   Since the previous refill, were any specialty medication doses or scheduled injections missed or delayed?  No  [Next dose due 5/14/2025 (Today)]   Does this patient require a clinical escalation to a pharmacist? No            Delivery Questions      Flowsheet Row Most Recent Value   Delivery method UPS   Delivery address verified with patient/caregiver? Yes   Delivery address Home   Other address preferred N/A   Number of medications in delivery 1   Medication(s) being filled and delivered Dupilumab (Dupixent)   Doses left of specialty medications 1 dose left per patient's wife.   Copay verified? Yes   Copay amount $0.00   Copay form of payment No copayment ($0)   Delivery Date Selection 05/15/25   Signature Required No   Do you consent to receive electronic handouts?  Yes                 Follow-up: 23 day(s)     Eldon White, Pharmacy Technician  5/14/2025  10:57 EDT

## 2025-06-18 ENCOUNTER — SPECIALTY PHARMACY (OUTPATIENT)
Dept: PHARMACY | Facility: TELEHEALTH | Age: 75
End: 2025-06-18
Payer: MEDICARE

## 2025-06-18 NOTE — PROGRESS NOTES
Specialty Pharmacy Patient Management Program  Refill Outreach     Onel was contacted today regarding refills of their medication(s).    Refill Questions      Flowsheet Row Most Recent Value   Changes to allergies? No   Changes to medications? No   New conditions or infections since last clinic visit No   Unplanned office visit, urgent care, ED, or hospital admission in the last 4 weeks  No   How does patient/caregiver feel medication is working? Very good   Financial problems or insurance changes  No   Since the previous refill, were any specialty medication doses or scheduled injections missed or delayed?  No  [Next dose due on 6/24/2025]   Does this patient require a clinical escalation to a pharmacist? No            Delivery Questions      Flowsheet Row Most Recent Value   Delivery method UPS   Delivery address verified with patient/caregiver? Yes   Delivery address Home   Other address preferred N/A   Number of medications in delivery 1   Medication(s) being filled and delivered Dupilumab (Dupixent)   Doses left of specialty medications 0 doses left per patient's wife.   Copay verified? Yes   Copay amount $0.00   Copay form of payment No copayment ($0)   Delivery Date Selection 06/19/25   Signature Required No   Do you consent to receive electronic handouts?  Yes                 Follow-up: 21 day(s)     Itzel Campbell, Pharmacy Technician  6/18/2025  11:42 EDT

## 2025-07-14 ENCOUNTER — SPECIALTY PHARMACY (OUTPATIENT)
Dept: PHARMACY | Facility: TELEHEALTH | Age: 75
End: 2025-07-14
Payer: MEDICARE

## 2025-07-15 ENCOUNTER — SPECIALTY PHARMACY (OUTPATIENT)
Dept: PHARMACY | Facility: TELEHEALTH | Age: 75
End: 2025-07-15
Payer: MEDICARE

## 2025-07-15 NOTE — PROGRESS NOTES
Specialty Pharmacy Patient Management Program  Refill Outreach     Onel was contacted today regarding refills of their medication(s).    Refill Questions      Flowsheet Row Most Recent Value   Changes to allergies? No   Changes to medications? Yes   [Patient has started Oxycodone for pain.]   New conditions or infections since last clinic visit No   Unplanned office visit, urgent care, ED, or hospital admission in the last 4 weeks  No   How does patient/caregiver feel medication is working? Very good   Financial problems or insurance changes  No   Since the previous refill, were any specialty medication doses or scheduled injections missed or delayed?  No  [Next dose due 7/22/2025]   Does this patient require a clinical escalation to a pharmacist? Yes             Delivery Questions      Flowsheet Row Most Recent Value   Delivery method UPS   Delivery address verified with patient/caregiver? Yes   Delivery address Home   Other address preferred N/A   Number of medications in delivery 1   Medication(s) being filled and delivered Dupilumab (Dupixent)   Doses left of specialty medications 0 doses left per patient's wife.   Copay verified? Yes   Copay amount $0.00   Copay form of payment No copayment ($0)   Delivery Date Selection 07/16/25   Signature Required No   Do you consent to receive electronic handouts?  Yes                 Follow-up: 23 day(s)     Eldon White, Pharmacy Technician  7/15/2025  11:01 EDT

## 2025-08-11 ENCOUNTER — SPECIALTY PHARMACY (OUTPATIENT)
Dept: PHARMACY | Facility: TELEHEALTH | Age: 75
End: 2025-08-11
Payer: MEDICARE

## 2025-08-13 ENCOUNTER — SPECIALTY PHARMACY (OUTPATIENT)
Dept: PHARMACY | Facility: TELEHEALTH | Age: 75
End: 2025-08-13
Payer: MEDICARE